# Patient Record
Sex: FEMALE | Race: WHITE | NOT HISPANIC OR LATINO | Employment: OTHER
[De-identification: names, ages, dates, MRNs, and addresses within clinical notes are randomized per-mention and may not be internally consistent; named-entity substitution may affect disease eponyms.]

---

## 2019-10-18 ENCOUNTER — RECORDS - HEALTHEAST (OUTPATIENT)
Dept: ADMINISTRATIVE | Facility: OTHER | Age: 64
End: 2019-10-18

## 2019-10-18 LAB
LAB AP CHARGES (HE HISTORICAL CONVERSION): NORMAL
PATH REPORT.COMMENTS IMP SPEC: NORMAL
PATH REPORT.FINAL DX SPEC: NORMAL
PATH REPORT.GROSS SPEC: NORMAL
PATH REPORT.MICROSCOPIC SPEC OTHER STN: NORMAL
PATH REPORT.RELEVANT HX SPEC: NORMAL
RESULT FLAG (HE HISTORICAL CONVERSION): NORMAL

## 2021-05-30 ENCOUNTER — RECORDS - HEALTHEAST (OUTPATIENT)
Dept: ADMINISTRATIVE | Facility: CLINIC | Age: 66
End: 2021-05-30

## 2024-10-04 ENCOUNTER — TRANSFERRED RECORDS (OUTPATIENT)
Dept: HEALTH INFORMATION MANAGEMENT | Facility: CLINIC | Age: 69
End: 2024-10-04
Payer: COMMERCIAL

## 2024-11-13 RX ORDER — ALBUTEROL SULFATE 0.83 MG/ML
2.5 SOLUTION RESPIRATORY (INHALATION) 3 TIMES DAILY PRN
COMMUNITY
Start: 2023-12-01

## 2024-11-13 RX ORDER — ACETAMINOPHEN 500 MG
2 TABLET ORAL EVERY 6 HOURS PRN
COMMUNITY

## 2024-11-13 RX ORDER — INSULIN ASPART 100 [IU]/ML
INJECTION, SOLUTION INTRAVENOUS; SUBCUTANEOUS
COMMUNITY
Start: 2024-07-19

## 2024-11-13 RX ORDER — CHOLECALCIFEROL (VITAMIN D3) 50 MCG
2000 TABLET ORAL DAILY
COMMUNITY

## 2024-11-13 RX ORDER — MOMETASONE FUROATE AND FORMOTEROL FUMARATE DIHYDRATE 100; 5 UG/1; UG/1
2 AEROSOL RESPIRATORY (INHALATION) 2 TIMES DAILY PRN
Status: ON HOLD | COMMUNITY
Start: 2024-07-30 | End: 2024-11-18

## 2024-11-13 RX ORDER — INSULIN ASPART 100 [IU]/ML
INJECTION, SOLUTION INTRAVENOUS; SUBCUTANEOUS
Status: ON HOLD | COMMUNITY
End: 2024-11-18

## 2024-11-13 RX ORDER — CYANOCOBALAMIN 1000 UG/ML
1000 INJECTION, SOLUTION INTRAMUSCULAR; SUBCUTANEOUS WEEKLY
COMMUNITY

## 2024-11-13 RX ORDER — TOPIRAMATE 50 MG/1
50 TABLET, FILM COATED ORAL 2 TIMES DAILY
COMMUNITY
Start: 2024-07-09 | End: 2025-07-09

## 2024-11-14 ENCOUNTER — TRANSFERRED RECORDS (OUTPATIENT)
Dept: HEALTH INFORMATION MANAGEMENT | Facility: CLINIC | Age: 69
End: 2024-11-14
Payer: COMMERCIAL

## 2024-11-14 ENCOUNTER — TRANSFERRED RECORDS (OUTPATIENT)
Dept: MULTI SPECIALTY CLINIC | Facility: CLINIC | Age: 69
End: 2024-11-14
Payer: COMMERCIAL

## 2024-11-14 LAB
CREATININE (EXTERNAL): 2.1 MG/DL (ref 0.7–1.4)
GLUCOSE (EXTERNAL): 105 MG/DL (ref 60–99)
HBA1C MFR BLD: 6.69 % (ref 4–5.6)
POTASSIUM (EXTERNAL): 3.8 MMOL/L (ref 3.5–5.1)

## 2024-11-17 ENCOUNTER — ANESTHESIA EVENT (OUTPATIENT)
Dept: SURGERY | Facility: CLINIC | Age: 69
End: 2024-11-17
Payer: COMMERCIAL

## 2024-11-17 ASSESSMENT — COPD QUESTIONNAIRES: COPD: 1

## 2024-11-18 ENCOUNTER — APPOINTMENT (OUTPATIENT)
Dept: RADIOLOGY | Facility: CLINIC | Age: 69
End: 2024-11-18
Attending: ORTHOPAEDIC SURGERY
Payer: COMMERCIAL

## 2024-11-18 ENCOUNTER — HOSPITAL ENCOUNTER (OUTPATIENT)
Facility: CLINIC | Age: 69
Discharge: HOME | End: 2024-11-19
Attending: ORTHOPAEDIC SURGERY | Admitting: ORTHOPAEDIC SURGERY
Payer: COMMERCIAL

## 2024-11-18 ENCOUNTER — ANESTHESIA (OUTPATIENT)
Dept: SURGERY | Facility: CLINIC | Age: 69
End: 2024-11-18
Payer: COMMERCIAL

## 2024-11-18 DIAGNOSIS — G95.9 CERVICAL MYELOPATHY (H): Primary | ICD-10-CM

## 2024-11-18 LAB
GLUCOSE BLDC GLUCOMTR-MCNC: 102 MG/DL (ref 70–99)
GLUCOSE BLDC GLUCOMTR-MCNC: 123 MG/DL (ref 70–99)
GLUCOSE BLDC GLUCOMTR-MCNC: 143 MG/DL (ref 70–99)
GLUCOSE BLDC GLUCOMTR-MCNC: 195 MG/DL (ref 70–99)
GLUCOSE BLDC GLUCOMTR-MCNC: 236 MG/DL (ref 70–99)

## 2024-11-18 PROCEDURE — 999N000157 HC STATISTIC RCP TIME EA 10 MIN

## 2024-11-18 PROCEDURE — 82962 GLUCOSE BLOOD TEST: CPT

## 2024-11-18 PROCEDURE — 272N000001 HC OR GENERAL SUPPLY STERILE: Performed by: ORTHOPAEDIC SURGERY

## 2024-11-18 PROCEDURE — 250N000011 HC RX IP 250 OP 636

## 2024-11-18 PROCEDURE — 258N000003 HC RX IP 258 OP 636: Performed by: ORTHOPAEDIC SURGERY

## 2024-11-18 PROCEDURE — C1762 CONN TISS, HUMAN(INC FASCIA): HCPCS | Performed by: ORTHOPAEDIC SURGERY

## 2024-11-18 PROCEDURE — 271N000001 HC OR GENERAL SUPPLY NON-STERILE: Performed by: ORTHOPAEDIC SURGERY

## 2024-11-18 PROCEDURE — 258N000003 HC RX IP 258 OP 636

## 2024-11-18 PROCEDURE — 250N000005 HC OR RX SURGIFLO HEMOSTATIC MATRIX 10ML 199102S OPNP: Performed by: ORTHOPAEDIC SURGERY

## 2024-11-18 PROCEDURE — 250N000009 HC RX 250: Performed by: ORTHOPAEDIC SURGERY

## 2024-11-18 PROCEDURE — 250N000012 HC RX MED GY IP 250 OP 636 PS 637: Performed by: FAMILY MEDICINE

## 2024-11-18 PROCEDURE — 999N000182 XR SURGERY CARM FLUORO GREATER THAN 5 MIN: Mod: TC

## 2024-11-18 PROCEDURE — C1713 ANCHOR/SCREW BN/BN,TIS/BN: HCPCS | Performed by: ORTHOPAEDIC SURGERY

## 2024-11-18 PROCEDURE — 250N000025 HC SEVOFLURANE, PER MIN: Performed by: ORTHOPAEDIC SURGERY

## 2024-11-18 PROCEDURE — 999N000141 HC STATISTIC PRE-PROCEDURE NURSING ASSESSMENT: Performed by: ORTHOPAEDIC SURGERY

## 2024-11-18 PROCEDURE — 272N000004 HC RX 272: Performed by: ORTHOPAEDIC SURGERY

## 2024-11-18 PROCEDURE — 250N000013 HC RX MED GY IP 250 OP 250 PS 637: Performed by: ORTHOPAEDIC SURGERY

## 2024-11-18 PROCEDURE — 250N000011 HC RX IP 250 OP 636: Performed by: ORTHOPAEDIC SURGERY

## 2024-11-18 PROCEDURE — 360N000078 HC SURGERY LEVEL 5, PER MIN: Performed by: ORTHOPAEDIC SURGERY

## 2024-11-18 PROCEDURE — 258N000003 HC RX IP 258 OP 636: Performed by: ANESTHESIOLOGY

## 2024-11-18 PROCEDURE — 250N000013 HC RX MED GY IP 250 OP 250 PS 637: Performed by: FAMILY MEDICINE

## 2024-11-18 PROCEDURE — 710N000010 HC RECOVERY PHASE 1, LEVEL 2, PER MIN: Performed by: ORTHOPAEDIC SURGERY

## 2024-11-18 PROCEDURE — 250N000011 HC RX IP 250 OP 636: Performed by: PHYSICIAN ASSISTANT

## 2024-11-18 PROCEDURE — 250N000013 HC RX MED GY IP 250 OP 250 PS 637: Performed by: ANESTHESIOLOGY

## 2024-11-18 PROCEDURE — 370N000017 HC ANESTHESIA TECHNICAL FEE, PER MIN: Performed by: ORTHOPAEDIC SURGERY

## 2024-11-18 PROCEDURE — 250N000009 HC RX 250

## 2024-11-18 PROCEDURE — 250N000013 HC RX MED GY IP 250 OP 250 PS 637: Performed by: PHYSICIAN ASSISTANT

## 2024-11-18 PROCEDURE — C1889 IMPLANT/INSERT DEVICE, NOC: HCPCS | Performed by: ORTHOPAEDIC SURGERY

## 2024-11-18 DEVICE — DBM T45001 1CC PASTE GRAFTON PLUS
Type: IMPLANTABLE DEVICE | Site: SPINE CERVICAL | Status: FUNCTIONAL
Brand: GRAFTON®AND GRAFTON PLUS®DEMINERALIZED BONE MATRIX (DBM)

## 2024-11-18 DEVICE — IMPLANTABLE DEVICE: Type: IMPLANTABLE DEVICE | Site: SPINE CERVICAL | Status: FUNCTIONAL

## 2024-11-18 RX ORDER — GABAPENTIN 300 MG/1
600 CAPSULE ORAL AT BEDTIME
COMMUNITY

## 2024-11-18 RX ORDER — PROPOFOL 10 MG/ML
INJECTION, EMULSION INTRAVENOUS PRN
Status: DISCONTINUED | OUTPATIENT
Start: 2024-11-18 | End: 2024-11-18

## 2024-11-18 RX ORDER — NALOXONE HYDROCHLORIDE 0.4 MG/ML
0.2 INJECTION, SOLUTION INTRAMUSCULAR; INTRAVENOUS; SUBCUTANEOUS
Status: DISCONTINUED | OUTPATIENT
Start: 2024-11-18 | End: 2024-11-19 | Stop reason: HOSPADM

## 2024-11-18 RX ORDER — CYCLOBENZAPRINE HCL 10 MG
10 TABLET ORAL 3 TIMES DAILY PRN
Status: DISCONTINUED | OUTPATIENT
Start: 2024-11-18 | End: 2024-11-19 | Stop reason: HOSPADM

## 2024-11-18 RX ORDER — ONDANSETRON 4 MG/1
4 TABLET, ORALLY DISINTEGRATING ORAL EVERY 6 HOURS PRN
Status: DISCONTINUED | OUTPATIENT
Start: 2024-11-18 | End: 2024-11-19 | Stop reason: HOSPADM

## 2024-11-18 RX ORDER — ACETAMINOPHEN 325 MG/1
650 TABLET ORAL EVERY 4 HOURS PRN
Status: DISCONTINUED | OUTPATIENT
Start: 2024-11-21 | End: 2024-11-19 | Stop reason: HOSPADM

## 2024-11-18 RX ORDER — HYDROMORPHONE HCL IN WATER/PF 6 MG/30 ML
0.4 PATIENT CONTROLLED ANALGESIA SYRINGE INTRAVENOUS
Status: DISCONTINUED | OUTPATIENT
Start: 2024-11-18 | End: 2024-11-19 | Stop reason: HOSPADM

## 2024-11-18 RX ORDER — ROSUVASTATIN CALCIUM 10 MG/1
40 TABLET, COATED ORAL DAILY
Status: DISCONTINUED | OUTPATIENT
Start: 2024-11-18 | End: 2024-11-19 | Stop reason: HOSPADM

## 2024-11-18 RX ORDER — MAGNESIUM HYDROXIDE 1200 MG/15ML
LIQUID ORAL PRN
Status: DISCONTINUED | OUTPATIENT
Start: 2024-11-18 | End: 2024-11-18 | Stop reason: HOSPADM

## 2024-11-18 RX ORDER — NALOXONE HYDROCHLORIDE 0.4 MG/ML
0.4 INJECTION, SOLUTION INTRAMUSCULAR; INTRAVENOUS; SUBCUTANEOUS
Status: DISCONTINUED | OUTPATIENT
Start: 2024-11-18 | End: 2024-11-19 | Stop reason: HOSPADM

## 2024-11-18 RX ORDER — GABAPENTIN 300 MG/1
600 CAPSULE ORAL AT BEDTIME
Status: DISCONTINUED | OUTPATIENT
Start: 2024-11-19 | End: 2024-11-19 | Stop reason: HOSPADM

## 2024-11-18 RX ORDER — CEFAZOLIN SODIUM/WATER 2 G/20 ML
2 SYRINGE (ML) INTRAVENOUS
Status: COMPLETED | OUTPATIENT
Start: 2024-11-18 | End: 2024-11-18

## 2024-11-18 RX ORDER — PROCHLORPERAZINE MALEATE 5 MG/1
5 TABLET ORAL EVERY 6 HOURS PRN
Status: DISCONTINUED | OUTPATIENT
Start: 2024-11-18 | End: 2024-11-19 | Stop reason: HOSPADM

## 2024-11-18 RX ORDER — ONDANSETRON 4 MG/1
4 TABLET, ORALLY DISINTEGRATING ORAL EVERY 30 MIN PRN
Status: DISCONTINUED | OUTPATIENT
Start: 2024-11-18 | End: 2024-11-18 | Stop reason: HOSPADM

## 2024-11-18 RX ORDER — DEXAMETHASONE SODIUM PHOSPHATE 4 MG/ML
INJECTION, SOLUTION INTRA-ARTICULAR; INTRALESIONAL; INTRAMUSCULAR; INTRAVENOUS; SOFT TISSUE
Status: DISCONTINUED
Start: 2024-11-18 | End: 2024-11-18 | Stop reason: HOSPADM

## 2024-11-18 RX ORDER — ACETAMINOPHEN 325 MG/1
975 TABLET ORAL ONCE
Status: COMPLETED | OUTPATIENT
Start: 2024-11-18 | End: 2024-11-18

## 2024-11-18 RX ORDER — BISACODYL 10 MG
10 SUPPOSITORY, RECTAL RECTAL DAILY PRN
Status: DISCONTINUED | OUTPATIENT
Start: 2024-11-21 | End: 2024-11-19 | Stop reason: HOSPADM

## 2024-11-18 RX ORDER — TOPIRAMATE 50 MG/1
50 TABLET, FILM COATED ORAL 2 TIMES DAILY
Status: DISCONTINUED | OUTPATIENT
Start: 2024-11-18 | End: 2024-11-19 | Stop reason: HOSPADM

## 2024-11-18 RX ORDER — ONDANSETRON 2 MG/ML
4 INJECTION INTRAMUSCULAR; INTRAVENOUS EVERY 6 HOURS PRN
Status: DISCONTINUED | OUTPATIENT
Start: 2024-11-18 | End: 2024-11-19 | Stop reason: HOSPADM

## 2024-11-18 RX ORDER — ONDANSETRON 2 MG/ML
INJECTION INTRAMUSCULAR; INTRAVENOUS PRN
Status: DISCONTINUED | OUTPATIENT
Start: 2024-11-18 | End: 2024-11-18

## 2024-11-18 RX ORDER — SODIUM CHLORIDE, SODIUM LACTATE, POTASSIUM CHLORIDE, CALCIUM CHLORIDE 600; 310; 30; 20 MG/100ML; MG/100ML; MG/100ML; MG/100ML
INJECTION, SOLUTION INTRAVENOUS CONTINUOUS
Status: DISCONTINUED | OUTPATIENT
Start: 2024-11-18 | End: 2024-11-18 | Stop reason: HOSPADM

## 2024-11-18 RX ORDER — CEFAZOLIN SODIUM/WATER 2 G/20 ML
2 SYRINGE (ML) INTRAVENOUS SEE ADMIN INSTRUCTIONS
Status: DISCONTINUED | OUTPATIENT
Start: 2024-11-18 | End: 2024-11-18 | Stop reason: HOSPADM

## 2024-11-18 RX ORDER — MULTIVITAMIN,THERAPEUTIC
1 TABLET ORAL DAILY
Status: DISCONTINUED | OUTPATIENT
Start: 2024-11-19 | End: 2024-11-19 | Stop reason: HOSPADM

## 2024-11-18 RX ORDER — OXYCODONE HYDROCHLORIDE 5 MG/1
5 TABLET ORAL EVERY 4 HOURS PRN
Status: DISCONTINUED | OUTPATIENT
Start: 2024-11-18 | End: 2024-11-19 | Stop reason: HOSPADM

## 2024-11-18 RX ORDER — FENTANYL CITRATE 50 UG/ML
INJECTION, SOLUTION INTRAMUSCULAR; INTRAVENOUS PRN
Status: DISCONTINUED | OUTPATIENT
Start: 2024-11-18 | End: 2024-11-18

## 2024-11-18 RX ORDER — FENTANYL CITRATE 50 UG/ML
25 INJECTION, SOLUTION INTRAMUSCULAR; INTRAVENOUS EVERY 5 MIN PRN
Status: DISCONTINUED | OUTPATIENT
Start: 2024-11-18 | End: 2024-11-18 | Stop reason: HOSPADM

## 2024-11-18 RX ORDER — DEXAMETHASONE SODIUM PHOSPHATE 4 MG/ML
4 INJECTION, SOLUTION INTRA-ARTICULAR; INTRALESIONAL; INTRAMUSCULAR; INTRAVENOUS; SOFT TISSUE
Status: DISCONTINUED | OUTPATIENT
Start: 2024-11-18 | End: 2024-11-18 | Stop reason: HOSPADM

## 2024-11-18 RX ORDER — DEXMEDETOMIDINE HYDROCHLORIDE 4 UG/ML
INJECTION, SOLUTION INTRAVENOUS CONTINUOUS PRN
Status: DISCONTINUED | OUTPATIENT
Start: 2024-11-18 | End: 2024-11-18

## 2024-11-18 RX ORDER — NICOTINE POLACRILEX 4 MG
15-30 LOZENGE BUCCAL
Status: DISCONTINUED | OUTPATIENT
Start: 2024-11-18 | End: 2024-11-19 | Stop reason: HOSPADM

## 2024-11-18 RX ORDER — LORATADINE 10 MG/1
10 TABLET ORAL DAILY PRN
Status: DISCONTINUED | OUTPATIENT
Start: 2024-11-18 | End: 2024-11-19 | Stop reason: HOSPADM

## 2024-11-18 RX ORDER — AMOXICILLIN 250 MG
1 CAPSULE ORAL 2 TIMES DAILY
Status: DISCONTINUED | OUTPATIENT
Start: 2024-11-18 | End: 2024-11-19 | Stop reason: HOSPADM

## 2024-11-18 RX ORDER — OXYCODONE HYDROCHLORIDE 5 MG/1
10 TABLET ORAL EVERY 4 HOURS PRN
Status: DISCONTINUED | OUTPATIENT
Start: 2024-11-18 | End: 2024-11-19 | Stop reason: HOSPADM

## 2024-11-18 RX ORDER — FENTANYL CITRATE 50 UG/ML
50 INJECTION, SOLUTION INTRAMUSCULAR; INTRAVENOUS EVERY 5 MIN PRN
Status: DISCONTINUED | OUTPATIENT
Start: 2024-11-18 | End: 2024-11-18 | Stop reason: HOSPADM

## 2024-11-18 RX ORDER — HYDROMORPHONE HCL IN WATER/PF 6 MG/30 ML
0.2 PATIENT CONTROLLED ANALGESIA SYRINGE INTRAVENOUS
Status: DISCONTINUED | OUTPATIENT
Start: 2024-11-18 | End: 2024-11-19 | Stop reason: HOSPADM

## 2024-11-18 RX ORDER — DEXTROSE MONOHYDRATE 25 G/50ML
25-50 INJECTION, SOLUTION INTRAVENOUS
Status: DISCONTINUED | OUTPATIENT
Start: 2024-11-18 | End: 2024-11-19 | Stop reason: HOSPADM

## 2024-11-18 RX ORDER — HYDROMORPHONE HCL IN WATER/PF 6 MG/30 ML
0.2 PATIENT CONTROLLED ANALGESIA SYRINGE INTRAVENOUS EVERY 5 MIN PRN
Status: DISCONTINUED | OUTPATIENT
Start: 2024-11-18 | End: 2024-11-18 | Stop reason: HOSPADM

## 2024-11-18 RX ORDER — ALBUTEROL SULFATE 90 UG/1
2 INHALANT RESPIRATORY (INHALATION) EVERY 6 HOURS PRN
Status: DISCONTINUED | OUTPATIENT
Start: 2024-11-18 | End: 2024-11-19 | Stop reason: HOSPADM

## 2024-11-18 RX ORDER — GABAPENTIN 100 MG/1
100 CAPSULE ORAL
Status: COMPLETED | OUTPATIENT
Start: 2024-11-18 | End: 2024-11-18

## 2024-11-18 RX ORDER — SODIUM CHLORIDE 9 MG/ML
INJECTION, SOLUTION INTRAVENOUS CONTINUOUS
Status: DISCONTINUED | OUTPATIENT
Start: 2024-11-18 | End: 2024-11-19

## 2024-11-18 RX ORDER — ALBUTEROL SULFATE 90 UG/1
2 INHALANT RESPIRATORY (INHALATION) EVERY 6 HOURS PRN
COMMUNITY

## 2024-11-18 RX ORDER — ACETAMINOPHEN 325 MG/1
975 TABLET ORAL EVERY 8 HOURS
Status: DISCONTINUED | OUTPATIENT
Start: 2024-11-18 | End: 2024-11-19 | Stop reason: HOSPADM

## 2024-11-18 RX ORDER — PROPOFOL 10 MG/ML
INJECTION, EMULSION INTRAVENOUS CONTINUOUS PRN
Status: DISCONTINUED | OUTPATIENT
Start: 2024-11-18 | End: 2024-11-18

## 2024-11-18 RX ORDER — GABAPENTIN 300 MG/1
300 CAPSULE ORAL EVERY MORNING
Status: DISCONTINUED | OUTPATIENT
Start: 2024-11-19 | End: 2024-11-19 | Stop reason: HOSPADM

## 2024-11-18 RX ORDER — ALBUTEROL SULFATE 0.83 MG/ML
2.5 SOLUTION RESPIRATORY (INHALATION) 3 TIMES DAILY PRN
Status: DISCONTINUED | OUTPATIENT
Start: 2024-11-18 | End: 2024-11-19 | Stop reason: HOSPADM

## 2024-11-18 RX ORDER — HYDROMORPHONE HCL IN WATER/PF 6 MG/30 ML
0.4 PATIENT CONTROLLED ANALGESIA SYRINGE INTRAVENOUS EVERY 5 MIN PRN
Status: DISCONTINUED | OUTPATIENT
Start: 2024-11-18 | End: 2024-11-18 | Stop reason: HOSPADM

## 2024-11-18 RX ORDER — ONDANSETRON 2 MG/ML
4 INJECTION INTRAMUSCULAR; INTRAVENOUS EVERY 30 MIN PRN
Status: DISCONTINUED | OUTPATIENT
Start: 2024-11-18 | End: 2024-11-18 | Stop reason: HOSPADM

## 2024-11-18 RX ORDER — NALOXONE HYDROCHLORIDE 0.4 MG/ML
0.1 INJECTION, SOLUTION INTRAMUSCULAR; INTRAVENOUS; SUBCUTANEOUS
Status: DISCONTINUED | OUTPATIENT
Start: 2024-11-18 | End: 2024-11-18 | Stop reason: HOSPADM

## 2024-11-18 RX ORDER — POLYETHYLENE GLYCOL 3350 17 G/17G
17 POWDER, FOR SOLUTION ORAL DAILY
Status: DISCONTINUED | OUTPATIENT
Start: 2024-11-19 | End: 2024-11-19 | Stop reason: HOSPADM

## 2024-11-18 RX ORDER — METOPROLOL SUCCINATE 100 MG/1
100 TABLET, EXTENDED RELEASE ORAL DAILY
Status: DISCONTINUED | OUTPATIENT
Start: 2024-11-19 | End: 2024-11-19 | Stop reason: HOSPADM

## 2024-11-18 RX ORDER — CEFAZOLIN SODIUM 1 G/3ML
1 INJECTION, POWDER, FOR SOLUTION INTRAMUSCULAR; INTRAVENOUS EVERY 8 HOURS
Status: COMPLETED | OUTPATIENT
Start: 2024-11-18 | End: 2024-11-19

## 2024-11-18 RX ORDER — DEXAMETHASONE SODIUM PHOSPHATE 10 MG/ML
INJECTION, SOLUTION INTRAMUSCULAR; INTRAVENOUS PRN
Status: DISCONTINUED | OUTPATIENT
Start: 2024-11-18 | End: 2024-11-18

## 2024-11-18 RX ORDER — LIDOCAINE HYDROCHLORIDE 10 MG/ML
INJECTION, SOLUTION INFILTRATION; PERINEURAL PRN
Status: DISCONTINUED | OUTPATIENT
Start: 2024-11-18 | End: 2024-11-18

## 2024-11-18 RX ORDER — LIDOCAINE 40 MG/G
CREAM TOPICAL
Status: DISCONTINUED | OUTPATIENT
Start: 2024-11-18 | End: 2024-11-18 | Stop reason: HOSPADM

## 2024-11-18 RX ADMIN — OXYCODONE 10 MG: 5 TABLET ORAL at 19:12

## 2024-11-18 RX ADMIN — PHENYLEPHRINE HYDROCHLORIDE 100 MCG: 10 INJECTION INTRAVENOUS at 11:23

## 2024-11-18 RX ADMIN — DEXMEDETOMIDINE 0.3 MCG/KG/HR: 100 INJECTION, SOLUTION, CONCENTRATE INTRAVENOUS at 11:04

## 2024-11-18 RX ADMIN — LIDOCAINE HYDROCHLORIDE 5 ML: 10 INJECTION, SOLUTION INFILTRATION; PERINEURAL at 11:04

## 2024-11-18 RX ADMIN — CEFAZOLIN 1 G: 1 INJECTION, POWDER, FOR SOLUTION INTRAMUSCULAR; INTRAVENOUS at 18:25

## 2024-11-18 RX ADMIN — PHENYLEPHRINE HYDROCHLORIDE 0.5 MCG/KG/MIN: 10 INJECTION INTRAVENOUS at 11:04

## 2024-11-18 RX ADMIN — HYDROMORPHONE HYDROCHLORIDE 0.4 MG: 0.2 INJECTION, SOLUTION INTRAMUSCULAR; INTRAVENOUS; SUBCUTANEOUS at 20:46

## 2024-11-18 RX ADMIN — PHENYLEPHRINE HYDROCHLORIDE 0.5 MCG/KG/MIN: 10 INJECTION INTRAVENOUS at 11:23

## 2024-11-18 RX ADMIN — ROSUVASTATIN CALCIUM 40 MG: 10 TABLET, FILM COATED ORAL at 17:05

## 2024-11-18 RX ADMIN — PHENYLEPHRINE HYDROCHLORIDE 100 MCG: 10 INJECTION INTRAVENOUS at 11:21

## 2024-11-18 RX ADMIN — GABAPENTIN 100 MG: 100 CAPSULE ORAL at 09:57

## 2024-11-18 RX ADMIN — ONDANSETRON 4 MG: 2 INJECTION INTRAMUSCULAR; INTRAVENOUS at 13:13

## 2024-11-18 RX ADMIN — PROPOFOL 150 MCG/KG/MIN: 10 INJECTION, EMULSION INTRAVENOUS at 11:04

## 2024-11-18 RX ADMIN — Medication 2 G: at 10:51

## 2024-11-18 RX ADMIN — SODIUM CHLORIDE, POTASSIUM CHLORIDE, SODIUM LACTATE AND CALCIUM CHLORIDE: 600; 310; 30; 20 INJECTION, SOLUTION INTRAVENOUS at 10:51

## 2024-11-18 RX ADMIN — PROPOFOL 110 MG: 10 INJECTION, EMULSION INTRAVENOUS at 11:04

## 2024-11-18 RX ADMIN — MIDAZOLAM 2 MG: 1 INJECTION INTRAMUSCULAR; INTRAVENOUS at 10:51

## 2024-11-18 RX ADMIN — INSULIN ASPART 2 UNITS: 100 INJECTION, SOLUTION INTRAVENOUS; SUBCUTANEOUS at 17:05

## 2024-11-18 RX ADMIN — HYDROMORPHONE HYDROCHLORIDE 0.5 MG: 1 INJECTION, SOLUTION INTRAMUSCULAR; INTRAVENOUS; SUBCUTANEOUS at 12:53

## 2024-11-18 RX ADMIN — TOPIRAMATE 50 MG: 50 TABLET, FILM COATED ORAL at 20:50

## 2024-11-18 RX ADMIN — Medication 60 MG: at 11:04

## 2024-11-18 RX ADMIN — ACETAMINOPHEN 975 MG: 325 TABLET ORAL at 20:50

## 2024-11-18 RX ADMIN — ACETAMINOPHEN 975 MG: 325 TABLET ORAL at 09:57

## 2024-11-18 RX ADMIN — PROPOFOL 30 MG: 10 INJECTION, EMULSION INTRAVENOUS at 11:31

## 2024-11-18 RX ADMIN — SODIUM CHLORIDE: 9 INJECTION, SOLUTION INTRAVENOUS at 16:15

## 2024-11-18 RX ADMIN — ONDANSETRON 4 MG: 4 TABLET, ORALLY DISINTEGRATING ORAL at 19:15

## 2024-11-18 RX ADMIN — DEXAMETHASONE SODIUM PHOSPHATE 4 MG: 10 INJECTION, SOLUTION INTRAMUSCULAR; INTRAVENOUS at 11:18

## 2024-11-18 RX ADMIN — FENTANYL CITRATE 100 MCG: 50 INJECTION INTRAMUSCULAR; INTRAVENOUS at 11:04

## 2024-11-18 RX ADMIN — HYDROMORPHONE HYDROCHLORIDE 0.5 MG: 1 INJECTION, SOLUTION INTRAMUSCULAR; INTRAVENOUS; SUBCUTANEOUS at 11:56

## 2024-11-18 ASSESSMENT — ACTIVITIES OF DAILY LIVING (ADL)
ADLS_ACUITY_SCORE: 0

## 2024-11-18 ASSESSMENT — LIFESTYLE VARIABLES: TOBACCO_USE: 1

## 2024-11-18 NOTE — OP NOTE
Orthopedic  Operative Note    Pre-operative diagnosis: 1.  Cervical myelopathy secondary to adjacent segment degeneration C4-5 above previous multilevel ACDF    Post-operative diagnosis: 1.  Cervical myelopathy secondary to adjacent segment degeneration C4-5 above previous multilevel ACDF    Procedure: 1.  C4-5 ACDF, SeaSpine   2.  C4-5 anterior cervical plate application   3.  Local autograft, allograft bone grafting   4.  Use of intraoperative microscopy   5.  EMG, SSEP, MEP neuromonitoring    Surgeon: Fernando Perez MD    Assistant(s): Oralia Forman PA-C is an experienced first surgical assistant whose assistance was necessary for patient positioning, hemostasis, soft tissue and neural retraction, closure, and safe progression of surgery.      Anesthesia: General endotracheal anesthesia    Estimated blood loss: 5 mL     Drains: None    Specimens: None    Indications:                               The patient is a 69-year-old female who developed cervical myelopathic symptoms in the setting of central stenosis above a previous ACDF.  She failed conservative measures and elected for surgical intervention in the form of a C4-5 ACDF.    I again reviewed the operative indications, the general and specific risks, benefits, and rehabilitation issues. Details of the procedure and postoperative recovery is highlighted. Patient and attending family appear to understand the issues and express their consent proceed.     Findings: None    Complications: None     Procedure Detail: The patient was evaluated in the preoperative holding area.  The patient was given the opportunity ask questions regarding the procedure in detail, including the risks, benefits, and alternatives.  The patient provided informed consent to proceed.  The patient's right sided neck was marked with ink in the surgeon's initials.  The patient was transported back to the operative suite on a gurney.  There, the patient was inducted under general  anesthesia by our anesthesia colleagues after being transferred to a regular OR table.  The arms were papoosed with ample padding of the ulnar nerve.  An axillary roll was placed underneath the patient's shoulder blades, and the patient's head was rested on a foam doughnut.  Care was taken not to over extend the neck.  The neck was then prepped and draped in the usual sterile fashion.  A preprocedural pause was performed to identify the correct patient, procedure to be performed, and administration of preoperative antibiotics.     The procedure was then initiated with a transverse incision along neck crease on the right side of the neck at the anticipated level of the procedure.  Subcutaneous tissues were divided using a Bovie, and the platysma was divided in line with the incision with a Bovie.  I then undermined the platysma both superiorly and inferiorly with the use of a Bovie and an Adson.  I then performed a standard Tubbs-Santiago approach on the right, exploring the interval between the carotid sheath on one side and the trachea and esophagus on the other side.  Once I was down to the pretracheal fascia, I moved the esophagus to the side using a Cloward, and used Kitners to thin the fascia and expose the ALL.  I used a curved clamp on the anticipated level of the operation, and obtained a crosstable lateral to confirm that we were in fact at the correct level.  Crosstable lateral confirmed that we were at the C4-5 interval.  Once the level had been confirmed, I cleaned the soft tissues off of the anterior spine using a Bovie.  I carefully mobilized the longus coli bilaterally so that they would fit underneath the trimline retractor.  Once the soft tissues and longus coli had been appropriately exposed and mobilized, I placed a trimline retractor.  The operative microscope was then brought in for better visualization.  I placed [place] pins measuring well mm in the vertebral bodies of C4 and C5, and began the  procedure at that level.  I first attempted to remove the cephalad screws of the old plate, but they were cold welded into the plate and could not be moved.  Therefore, I used a metal cutting bur to resect the cephalad portion of the plate, taking care to retain as many metal fragments as possible using gel.  After I had removed the cephalad portion of the plate, I removed the anterior osteophyte of C4 and retained this for bone graft purposes.  I then used a Bovie to perform an annulotomy, and then used a combination of high-speed bur, straight, and up-biting curettes to remove the disc material.  Once I was down to PLL, I used a to be reverse angled 2B curette to divide the PLL at the level of the foramen.  I then excised the PLL using a combination of #1 and #2 Kerrisons.  Once uncovertebral osteophytes were removed, I interrogated the foramen bilaterally with a blunt nerve hook, and found them to be widely patent.  Hemostasis was obtained using combination of Surgi-Marcus and bipolar electrocautery.  I then trialed screw sizes, and found size 12 to be appropriate for this patient.  I then trialed implant sizes, and found a 5 mm small footprint trial to be appropriate for this patient.  I then ensured that all cartilaginous material was removed from the endplates, and carefully brushed the endplates with a high-speed bur.  I then placed the final implant. packed with local autograft and allograft.     Once this had been completed, the [place] pins were removed and their holes were packed with bone wax.  I then placed a plate, and secured it in place with screws. I locked each screw in place.  A final AP and lateral was obtained, which confirmed appropriate positioning and levels of the implants.  There was minimal bleeding so I elected not to place a drain. The wound was copiously irrigated.  I obtained final hemostasis using bipolar electrocautery and Floseal.  I inspected the esophagus and found no obvious signs of  trauma.  The platysma was closed with a running 3-0 PDS.  The skin was closed with 4-0 Monocryl, and Steri-Strips were placed over the wound.  All sponge and needle counts were correct at the end of the procedure.  The patient tolerated procedure without complication.  EMG, SSEP, MEP neuro monitoring were stable throughout the procedure, and in fact the motors improved following completion of the decompression.             Condition: Stable     Weight bearing status: Weight bearing as tolerated     Activity:      Anticoagulation plan:    Plan:      Fernando Perez MD  [facility]  Date:  11/18/2024  1:10 PM   Activity as tolerated  Patient may move about with assist as indicated or with supervision    Ambulation and mechanical prophylaxis.    The patient will be transferred to the floor once they clear PACU criteria.  Ancef will be given postoperatively for antibiotic prophylaxis, and the patient will mobilize for DVT prophylaxis.  The patient will be on strict no heavy lifting, twisting, or bending requirements for the next 3 months.  The patient will follow-up with me in 2 weeks for a follow-up visit.

## 2024-11-18 NOTE — CONSULTS
[facility] [place] MEDICINE CONSULT NOTE   Physician requesting consult: Fernando Perez*    Reason for consult: Postoperative medical management of medical co-morbidities as below    Assessment and Plan    Michaelle Olsen is a 69 year old female with a history of DM2, dyslipidemia, essential hypertension, coronary artery disease with coronary artery stent, CKD 3, copd, obstructive sleep apnea, compliant with CPAP use, underwent C4-C5 and anterior cervical discectomy and fusion.  Back pain is stable.  No new radiculopathy.  Hemodynamically stable postoperatively.    Procedure(s):  CERVICAL 4- CERVICAL 5 ANTERIOR CERVICAL DISCECTOMY FUSION  Post-operative Day: Day of Surgery  Code status:Full Code     Essential hypertension  Metoprolol 100 mg daily    DM2  Lantus 25 unit twice a day  Lantus 15 unit tonight, lower dose  Diabetic diet and insulin sliding scale    Dyslipidemia  Crestor 40 mg daily    Coronary artery disease with coronary artery stent  Stable and asymptomatic  Resume aspirin, beta-blocker, statin    Obstructive sleep apnea  Compliant with CPAP use    COPD  Stable and asymptomatic  Albuterol inhaler/nebulizer 4 times a day as needed    CKD 3  Creatinine was 2 on 11/14    Status post C4-C5 anterior cervical discectomy and fusion  Resume routine postoperative care  Physical and Occupational Therapy  Use incentive spirometry frequently  DVT prophylaxis per orthopedic surgery, early ambulation and SCDs  Pain control with Tylenol 975 mg every 8 hours, 650 mg every 4 hours as needed, oxycodone 5 to 10 mg every 4 hours as needed, IV Dilaudid 0.2 to 0.4 mg every 2 hours as needed    -Reviewed the patient's preoperative H and P and updated missing elements.  -Home medication reconciliation has been reviewed.  Medications have been ordered as noted from the home list and changes are documented above     HISTORY     Michaelle Olsen is a 69 year old female with a history of DM2,  dyslipidemia, essential hypertension, coronary artery disease with coronary artery stent, CKD 3, copd, obstructive sleep apnea, compliant with CPAP use, underwent C4-C5 and anterior cervical discectomy and fusion.  Back pain is stable.  No new radiculopathy.  Hemodynamically stable postoperatively.  Taking metoprolol 100 mg daily for hypertension.  Coronary artery disease is stable.  Taking aspirin, beta-blocker, statin.  Denies headache, chest pain, breathing difficulty, palpitation, nausea, vomiting, abdominal pain or urinary symptoms.  Does not have history of bleeding or clotting disorder.  Preop physical is reviewed.  History is provided by the patient.    Past Medical History     Patient Active Problem List    Diagnosis Date Noted    Cervical myelopathy (H) 11/18/2024     Priority: Medium        Surgical History   She  has a past surgical history that includes Laminectomy lumbar posterior microscopic one level (Bilateral, 11/15/2016) and IR Lumbar Puncture (5/21/2019).     Past Surgical History:   Procedure Laterality Date    IR LUMBAR PUNCTURE  5/21/2019    LAMINECTOMY LUMBAR POSTERIOR MICROSCOPIC ONE LEVEL Bilateral 11/15/2016    Procedure: LAMINECTOMY LUMBAR POSTERIOR MICROSCOPIC ONE LEVEL;  Surgeon: Kushal Coto MD;  Location: [facility] OR       Family History    Reviewed.  Positive family history of coronary artery disease, stroke, diabetes, thyroid disease    Social History    Reviewed, and she  reports that she has quit smoking. Her smoking use included cigarettes. She has never used smokeless tobacco. She reports that she does not currently use alcohol. She reports that she does not currently use drugs.  Social History     Tobacco Use    Smoking status: Former     Types: Cigarettes    Smokeless tobacco: Never   Substance Use Topics    Alcohol use: Not Currently      Allergies   No Known Allergies    Prior to Admission Medications      Medications Prior to Admission   Medication Sig Dispense Refill  Last Dose/Taking    acetaminophen (TYLENOL) 500 MG tablet Take 2 tablets by mouth every 6 hours as needed.   Past Week    albuterol (PROAIR HFA/PROVENTIL HFA/VENTOLIN HFA) 108 (90 Base) MCG/ACT inhaler Inhale 2 puffs into the lungs every 6 hours as needed for shortness of breath, wheezing or cough.   Past Week    albuterol (PROVENTIL) (2.5 MG/3ML) 0.083% neb solution Inhale 2.5 mg into the lungs 3 times daily as needed.   Past Month    aspirin 81 MG tablet Take 81 mg by mouth daily   11/2/2024 Morning    cyanocobalamin (CYANOCOBALAMIN) 1000 mcg/mL injection Inject 1,000 mcg into the muscle once a week.   Past Week    CYCLOBENZAPRINE HCL PO Take 10 mg by mouth 3 times daily.   11/17/2024 Evening    gabapentin (NEURONTIN) 300 MG capsule Take 600 mg by mouth at bedtime.   11/17/2024 Evening    gabapentin (NEURONTIN) 300 MG capsule Take 300 mg by mouth every morning.   11/17/2024 Morning    insulin aspart (NOVOLOG FLEXPEN) 100 UNIT/ML pen Inject subcutaneously 3 times daily (with meals). 1 Unit per 4 Grams of carb at meals   11/17/2024 Evening    insulin glargine (LANTUS) 100 UNIT/ML PEN Inject 25 Units subcutaneously 2 times daily.   11/17/2024 Evening    METOPROLOL SUCCINATE ER PO Take 100 mg by mouth daily.   11/18/2024    Rosuvastatin Calcium (CRESTOR PO) Take 40 mg by mouth daily.   11/17/2024 Morning    topiramate (TOPAMAX) 50 MG tablet Take 50 mg by mouth 2 times daily.   11/17/2024 Evening    Vitamin D3 50 mcg (2000 units) tablet Take 2,000 Units by mouth daily.   Past Week Morning       Review of Systems   A 12 point comprehensive review of systems was negative except as noted above.    OBJECTIVE         Physical Exam   Temp:  [97  F (36.1  C)-97.7  F (36.5  C)] 97.2  F (36.2  C)  Pulse:  [63-73] 70  Resp:  [6-16] 10  BP: (107-147)/(58-86) 145/86  SpO2:  [95 %-99 %] 97 %  Body mass index is 26.37 kg/m .    GENERAL:  Alert, appears comfortable, in no acute distress, appears stated age   HEAD:  Normocephalic,  without obvious abnormality, atraumatic   EYES:  PERRL, conjunctiva clear,  EOM's intact   NOSE: Nares normal,  mucosa normal, no drainage   THROAT: Lips, mucosa,  gums normal, mouth moist   NECK: Supple, status post anterior neck surgery   BACK:   Symmetric, no curvature, ROM normal   LUNGS:   Clear to auscultation bilaterally, no rales, rhonchi, or wheezing, symmetric chest rise on inhalation, respirations unlabored   CHEST WALL:  No tenderness or deformity   HEART:  Regular rate and rhythm, S1 and S2 normal, no murmur, rub, or gallop    ABDOMEN:   Soft, non-tender, bowel sounds active , no masses, no organomegaly, no rebound or guarding   EXTREMITIES: No BLE edema    SKIN: No rashes in the visualized areas   NEURO: Alert, oriented x 3    PSYCH: Cooperative, behavior is appropriate        Labs Reviewed Personally By Myself   Hemoglobin A1c 6.7  Creatinine 2    Preoperative Labs Reviewed Personally By Myself     Thank you for this consultation.  Appreciate the opportunity to participate in the care of Michaelle Olsen, please feel free to contact us for any questions or concerns.    Total time spent 70 min  Sravanthi Yeung MD  [facility]  [facility]  Phone: #[phone]

## 2024-11-18 NOTE — ANESTHESIA CARE TRANSFER NOTE
Patient: Michaelle Olsen    Procedure: Procedure(s):  CERVICAL 4- CERVICAL 5 ANTERIOR CERVICAL DISCECTOMY FUSION       Diagnosis: Neck pain [M54.2]  Spinal stenosis in cervical region [M48.02]  Brachial neuritis [M54.12]  Diagnosis Additional Information: No value filed.    Anesthesia Type:   General     Note:    Oropharynx: oral airway in place and spontaneously breathing  Level of Consciousness: drowsy  Oxygen Supplementation: face mask  Level of Supplemental Oxygen (L/min / FiO2): 10  Independent Airway: airway patency satisfactory and stable  Dentition: dentition unchanged  Vital Signs Stable: post-procedure vital signs reviewed and stable  Report to RN Given: handoff report given  Patient transferred to: PACU    Handoff Report: Identifed the Patient, Identified the Reponsible Provider, Reviewed the pertinent medical history, Discussed the surgical course, Reviewed Intra-OP anesthesia mangement and issues during anesthesia, Set expectations for post-procedure period and Allowed opportunity for questions and acknowledgement of understanding      Vitals:  Vitals Value Taken Time   /58 11/18/24 1352   Temp 36.3  C (97.34  F) 11/18/24 1356   Pulse 71 11/18/24 1356   Resp 14 11/18/24 1356   SpO2 99 % 11/18/24 1356   Vitals shown include unfiled device data.    Electronically Signed By: JUANA Dixon CRNA  November 18, 2024  1:58 PM

## 2024-11-18 NOTE — PROVIDER NOTIFICATION
Pt came to PACU with oral airway intact, requiring chin lift for adequate breathing, MDA made aware, once patient was more awake, Oral airway removed at 1420, and Home CPAP placed on patient. Monitored Patient closely with no other Breathing difficulties.

## 2024-11-18 NOTE — ANESTHESIA PREPROCEDURE EVALUATION
Anesthesia Pre-Procedure Evaluation    Patient: Michaelle Olsen   MRN: [id number] : 1955        Procedure : Procedure(s):  CERVICAL 4- CERVICAL 5 ANTERIOR CERVICAL DISCECTOMY FUSION          Past Medical History:   Diagnosis Date     Diabetes (H)      Heart attack (H)      Sleep apnea      Stented coronary artery      Uncomplicated asthma       Past Surgical History:   Procedure Laterality Date     IR LUMBAR PUNCTURE  2019     LAMINECTOMY LUMBAR POSTERIOR MICROSCOPIC ONE LEVEL Bilateral 11/15/2016    Procedure: LAMINECTOMY LUMBAR POSTERIOR MICROSCOPIC ONE LEVEL;  Surgeon: Kushal Coto MD;  Location: [facility] OR      No Known Allergies   Social History     Tobacco Use     Smoking status: Never     Smokeless tobacco: Never   Substance Use Topics     Alcohol use: Not Currently      Wt Readings from Last 1 Encounters:   11/15/16 61.2 kg (135 lb)        Anesthesia Evaluation   Pt has had prior anesthetic.     No history of anesthetic complications       ROS/MED HX  ENT/Pulmonary:     (+) sleep apnea, uses CPAP,              tobacco use (quit 10/2024), Past use,     asthma    COPD (Emphysema),              Neurologic:       Cardiovascular:     (+)  hypertension- -  CAD - past MI - stent-                                   (-) angina, CHICAS and angina   METS/Exercise Tolerance: >4 METS    Hematologic:     (+)      anemia,          Musculoskeletal: Comment: Cervical spinal stenosis      GI/Hepatic:  - neg GI/hepatic ROS     Renal/Genitourinary:     (+) renal disease, type: CRI,            Endo:     (+)  type II DM,   Using insulin,                 Psychiatric/Substance Use:       Infectious Disease:       Malignancy:       Other:            Physical Exam    Airway        Mallampati: II   TM distance: > 3 FB   Neck ROM: full   Mouth opening: > 3 cm    Respiratory Devices and Support         Dental       (+) Multiple crowns, permanant bridges    B=Bridge, C=Chipped, L=Loose, M=Missing    Cardiovascular    "cardiovascular exam normal          Pulmonary   pulmonary exam normal            OUTSIDE LABS:  CBC: No results found for: \"WBC\", \"HGB\", \"HCT\", \"PLT\"  BMP: No results found for: \"NA\", \"POTASSIUM\", \"CHLORIDE\", \"CO2\", \"BUN\", \"CR\", \"GLC\"  COAGS: No results found for: \"PTT\", \"INR\", \"FIBR\"  POC:   Lab Results   Component Value Date    BGM 95 11/15/2016     HEPATIC: No results found for: \"ALBUMIN\", \"PROTTOTAL\", \"ALT\", \"AST\", \"GGT\", \"ALKPHOS\", \"BILITOTAL\", \"BILIDIRECT\", \"MICH\"  OTHER: No results found for: \"PH\", \"LACT\", \"A1C\", \"SHEILA\", \"PHOS\", \"MAG\", \"LIPASE\", \"AMYLASE\", \"TSH\", \"T4\", \"T3\", \"CRP\", \"SED\"    Anesthesia Plan    ASA Status:  3    NPO Status:  NPO Appropriate    Anesthesia Type: General.     - Airway: ETT   Induction: Intravenous, Propofol.   Maintenance: Balanced.   Techniques and Equipment:     - Airway: Video-Laryngoscope       Consents    Anesthesia Plan(s) and associated risks, benefits, and realistic alternatives discussed. Questions answered and patient/representative(s) expressed understanding.     - Discussed: Risks, Benefits and Alternatives for BOTH SEDATION and the PROCEDURE were discussed     - Discussed with:  Patient       - Patient is DNR/DNI Status: No     Use of blood products discussed: Yes.     - Discussed with: Patient.     - Consented: consented to blood products     Postoperative Care    Pain management: Multi-modal analgesia.   PONV prophylaxis: Ondansetron (or other 5HT-3), Dexamethasone or Solumedrol, Background Propofol Infusion     Comments:    Other Comments: Glidescope, neutral intubation  RSI    Precedex  Magnesium    Keep MAP greater than 75  Monitor BS     Neuromonitoring         Karla Toscano MD    I have reviewed the pertinent notes and labs in the chart from the past 30 days and (re)examined the patient.  Any updates or changes from those notes are reflected in this note.                                   "

## 2024-11-18 NOTE — ANESTHESIA POSTPROCEDURE EVALUATION
Patient: Michaelle Olsen    Procedure: Procedure(s):  CERVICAL 4- CERVICAL 5 ANTERIOR CERVICAL DISCECTOMY FUSION       Anesthesia Type:  General    Note:  Disposition: Admission   Postop Pain Control: Uneventful            Sign Out: Well controlled pain   PONV: No   Neuro/Psych: Uneventful            Sign Out: Acceptable/Baseline neuro status   Airway/Respiratory: Uneventful            Sign Out: Acceptable/Baseline resp. status   CV/Hemodynamics: Uneventful            Sign Out: Acceptable CV status; No obvious hypovolemia; No obvious fluid overload   Other NRE: NONE   DID A NON-ROUTINE EVENT OCCUR? No           Last vitals:  Vitals Value Taken Time   /79 11/18/24 1540   Temp 36.4  C (97.5  F) 11/18/24 1540   Pulse 73 11/18/24 1540   Resp 10 11/18/24 1540   SpO2 94 % 11/18/24 1540       Electronically Signed By: Karla Toscano MD  November 18, 2024  3:49 PM

## 2024-11-18 NOTE — ANESTHESIA PROCEDURE NOTES
Airway       Patient location during procedure: OR       Procedure Start/Stop Times: 11/18/2024 11:08 AM  Staff -        Anesthesiologist:  Karla Toscano MD       CRNA: Héctor Matamoros APRN CRNA       Performed By: CRNA  Consent for Airway        Urgency: elective  Indications and Patient Condition       Indications for airway management: rosa-procedural       Induction type:intravenous       Mask difficulty assessment: 1 - vent by mask    Final Airway Details       Final airway type: endotracheal airway       Successful airway: ETT - single and Oral  Endotracheal Airway Details        ETT size (mm): 7.0       Cuffed: yes       Cuff volume (mL): 8       Successful intubation technique: video laryngoscopy       VL Blade Size: Glidescope 3       Grade View of Cords: 1       Adjucts: stylet       Position: Left       Measured from: lips       Secured at (cm): 22       Bite block used: Soft    Post intubation assessment        Placement verified by: capnometry, equal breath sounds and chest rise        Number of attempts at approach: 1       Number of other approaches attempted: 0       Secured with: tape       Ease of procedure: easy       Dentition: Intact and Unchanged    Medication(s) Administered   Medication Administration Time: 11/18/2024 11:08 AM

## 2024-11-18 NOTE — PHARMACY-ADMISSION MEDICATION HISTORY
Pharmacist Admission Medication History    Admission medication history is complete. The information provided in this note is only as accurate as the sources available at the time of the update.    Information Source(s): Patient and CareEverywhere/SureScripts via in-person    Pertinent Information:  None     Allergies reviewed with patient and updates made in EHR: yes    Medication History Completed By: Kaden Ahmadi [facility] 11/18/2024 10:00 AM    PTA Med List   Medication Sig Last Dose/Taking    acetaminophen (TYLENOL) 500 MG tablet Take 2 tablets by mouth every 6 hours as needed. Past Week    albuterol (PROAIR HFA/PROVENTIL HFA/VENTOLIN HFA) 108 (90 Base) MCG/ACT inhaler Inhale 2 puffs into the lungs every 6 hours as needed for shortness of breath, wheezing or cough. Past Week    albuterol (PROVENTIL) (2.5 MG/3ML) 0.083% neb solution Inhale 2.5 mg into the lungs 3 times daily as needed. Past Month    aspirin 81 MG tablet Take 81 mg by mouth daily 11/2/2024 Morning    cyanocobalamin (CYANOCOBALAMIN) 1000 mcg/mL injection Inject 1,000 mcg into the muscle once a week. Past Week    CYCLOBENZAPRINE HCL PO Take 10 mg by mouth 3 times daily. 11/17/2024 Evening    gabapentin (NEURONTIN) 300 MG capsule Take 600 mg by mouth at bedtime. 11/17/2024 Evening    gabapentin (NEURONTIN) 300 MG capsule Take 300 mg by mouth every morning. 11/17/2024 Morning    insulin aspart (NOVOLOG FLEXPEN) 100 UNIT/ML pen Inject subcutaneously 3 times daily (with meals). 1 Unit per 4 Grams of carb at meals 11/17/2024 Evening    insulin glargine (LANTUS) 100 UNIT/ML PEN Inject 25 Units subcutaneously 2 times daily. 11/17/2024 Evening    METOPROLOL SUCCINATE ER PO Take 100 mg by mouth daily. 11/18/2024    Rosuvastatin Calcium (CRESTOR PO) Take 40 mg by mouth daily. 11/17/2024 Morning    topiramate (TOPAMAX) 50 MG tablet Take 50 mg by mouth 2 times daily. 11/17/2024 Evening    Vitamin D3 50 mcg (2000 units) tablet Take 2,000 Units by mouth daily. Past  Week Morning

## 2024-11-19 ENCOUNTER — APPOINTMENT (OUTPATIENT)
Dept: RADIOLOGY | Facility: CLINIC | Age: 69
End: 2024-11-19
Attending: ORTHOPAEDIC SURGERY
Payer: COMMERCIAL

## 2024-11-19 ENCOUNTER — APPOINTMENT (OUTPATIENT)
Dept: OCCUPATIONAL THERAPY | Facility: CLINIC | Age: 69
End: 2024-11-19
Attending: ORTHOPAEDIC SURGERY
Payer: COMMERCIAL

## 2024-11-19 ENCOUNTER — APPOINTMENT (OUTPATIENT)
Dept: PHYSICAL THERAPY | Facility: CLINIC | Age: 69
End: 2024-11-19
Attending: ORTHOPAEDIC SURGERY
Payer: COMMERCIAL

## 2024-11-19 VITALS
OXYGEN SATURATION: 100 % | SYSTOLIC BLOOD PRESSURE: 125 MMHG | TEMPERATURE: 97.9 F | DIASTOLIC BLOOD PRESSURE: 60 MMHG | RESPIRATION RATE: 16 BRPM | HEIGHT: 60 IN | HEART RATE: 73 BPM | BODY MASS INDEX: 26.5 KG/M2 | WEIGHT: 135 LBS

## 2024-11-19 LAB
ANION GAP SERPL CALCULATED.3IONS-SCNC: 14 MMOL/L (ref 7–15)
BUN SERPL-MCNC: 18.1 MG/DL (ref 8–23)
CALCIUM SERPL-MCNC: 8.7 MG/DL (ref 8.8–10.4)
CHLORIDE SERPL-SCNC: 107 MMOL/L (ref 98–107)
CREAT SERPL-MCNC: 1.95 MG/DL (ref 0.51–0.95)
EGFRCR SERPLBLD CKD-EPI 2021: 27 ML/MIN/1.73M2
ERYTHROCYTE [DISTWIDTH] IN BLOOD BY AUTOMATED COUNT: 13.4 % (ref 10–15)
GLUCOSE BLDC GLUCOMTR-MCNC: 139 MG/DL (ref 70–99)
GLUCOSE BLDC GLUCOMTR-MCNC: 140 MG/DL (ref 70–99)
GLUCOSE BLDC GLUCOMTR-MCNC: 181 MG/DL (ref 70–99)
GLUCOSE SERPL-MCNC: 147 MG/DL (ref 70–99)
HCO3 SERPL-SCNC: 20 MMOL/L (ref 22–29)
HCT VFR BLD AUTO: 37.6 % (ref 35–47)
HGB BLD-MCNC: 12.1 G/DL (ref 11.7–15.7)
MCH RBC QN AUTO: 28.6 PG (ref 26.5–33)
MCHC RBC AUTO-ENTMCNC: 32.2 G/DL (ref 31.5–36.5)
MCV RBC AUTO: 89 FL (ref 78–100)
PLATELET # BLD AUTO: 139 10E3/UL (ref 150–450)
POTASSIUM SERPL-SCNC: 3.7 MMOL/L (ref 3.4–5.3)
RBC # BLD AUTO: 4.23 10E6/UL (ref 3.8–5.2)
SODIUM SERPL-SCNC: 141 MMOL/L (ref 135–145)
WBC # BLD AUTO: 12.9 10E3/UL (ref 4–11)

## 2024-11-19 PROCEDURE — 250N000013 HC RX MED GY IP 250 OP 250 PS 637: Performed by: FAMILY MEDICINE

## 2024-11-19 PROCEDURE — 80048 BASIC METABOLIC PNL TOTAL CA: CPT | Performed by: ORTHOPAEDIC SURGERY

## 2024-11-19 PROCEDURE — 999N000065 XR CERVICAL SPINE 2/3 VIEWS

## 2024-11-19 PROCEDURE — 97535 SELF CARE MNGMENT TRAINING: CPT | Mod: GO

## 2024-11-19 PROCEDURE — 97161 PT EVAL LOW COMPLEX 20 MIN: CPT | Mod: GP

## 2024-11-19 PROCEDURE — 97116 GAIT TRAINING THERAPY: CPT | Mod: GP

## 2024-11-19 PROCEDURE — 85027 COMPLETE CBC AUTOMATED: CPT | Performed by: ORTHOPAEDIC SURGERY

## 2024-11-19 PROCEDURE — 250N000011 HC RX IP 250 OP 636: Performed by: ORTHOPAEDIC SURGERY

## 2024-11-19 PROCEDURE — 82435 ASSAY OF BLOOD CHLORIDE: CPT | Performed by: ORTHOPAEDIC SURGERY

## 2024-11-19 PROCEDURE — 97166 OT EVAL MOD COMPLEX 45 MIN: CPT | Mod: GO

## 2024-11-19 PROCEDURE — 258N000003 HC RX IP 258 OP 636: Performed by: ORTHOPAEDIC SURGERY

## 2024-11-19 PROCEDURE — 250N000013 HC RX MED GY IP 250 OP 250 PS 637: Performed by: ORTHOPAEDIC SURGERY

## 2024-11-19 PROCEDURE — 250N000012 HC RX MED GY IP 250 OP 636 PS 637: Performed by: FAMILY MEDICINE

## 2024-11-19 PROCEDURE — 82962 GLUCOSE BLOOD TEST: CPT

## 2024-11-19 PROCEDURE — 82565 ASSAY OF CREATININE: CPT | Performed by: ORTHOPAEDIC SURGERY

## 2024-11-19 PROCEDURE — 36415 COLL VENOUS BLD VENIPUNCTURE: CPT | Performed by: ORTHOPAEDIC SURGERY

## 2024-11-19 RX ORDER — AMOXICILLIN 250 MG
1 CAPSULE ORAL 2 TIMES DAILY PRN
Qty: 42 TABLET | Refills: 0 | Status: SHIPPED | OUTPATIENT
Start: 2024-11-19

## 2024-11-19 RX ORDER — ASPIRIN 81 MG/1
TABLET ORAL
COMMUNITY
Start: 2024-11-19

## 2024-11-19 RX ORDER — OXYCODONE HYDROCHLORIDE 5 MG/1
5-10 TABLET ORAL EVERY 4 HOURS PRN
Qty: 30 TABLET | Refills: 0 | Status: SHIPPED | OUTPATIENT
Start: 2024-11-19

## 2024-11-19 RX ORDER — CYCLOBENZAPRINE HCL 10 MG
10 TABLET ORAL 3 TIMES DAILY PRN
COMMUNITY
Start: 2024-11-19

## 2024-11-19 RX ADMIN — CYCLOBENZAPRINE 10 MG: 10 TABLET, FILM COATED ORAL at 07:49

## 2024-11-19 RX ADMIN — METOPROLOL SUCCINATE 100 MG: 50 TABLET, EXTENDED RELEASE ORAL at 09:31

## 2024-11-19 RX ADMIN — CEFAZOLIN 1 G: 1 INJECTION, POWDER, FOR SOLUTION INTRAMUSCULAR; INTRAVENOUS at 02:24

## 2024-11-19 RX ADMIN — SODIUM CHLORIDE: 9 INJECTION, SOLUTION INTRAVENOUS at 01:00

## 2024-11-19 RX ADMIN — OXYCODONE 10 MG: 5 TABLET ORAL at 00:53

## 2024-11-19 RX ADMIN — INSULIN ASPART 1 UNITS: 100 INJECTION, SOLUTION INTRAVENOUS; SUBCUTANEOUS at 07:50

## 2024-11-19 RX ADMIN — OXYCODONE 10 MG: 5 TABLET ORAL at 09:31

## 2024-11-19 RX ADMIN — ACETAMINOPHEN 975 MG: 325 TABLET ORAL at 12:01

## 2024-11-19 RX ADMIN — TOPIRAMATE 50 MG: 50 TABLET, FILM COATED ORAL at 07:49

## 2024-11-19 RX ADMIN — OXYCODONE 10 MG: 5 TABLET ORAL at 04:58

## 2024-11-19 RX ADMIN — ROSUVASTATIN CALCIUM 40 MG: 10 TABLET, FILM COATED ORAL at 07:48

## 2024-11-19 RX ADMIN — ACETAMINOPHEN 975 MG: 325 TABLET ORAL at 04:55

## 2024-11-19 RX ADMIN — THERA TABS 1 TABLET: TAB at 07:49

## 2024-11-19 RX ADMIN — GABAPENTIN 300 MG: 300 CAPSULE ORAL at 07:49

## 2024-11-19 RX ADMIN — INSULIN GLARGINE 25 UNITS: 100 INJECTION, SOLUTION SUBCUTANEOUS at 07:49

## 2024-11-19 RX ADMIN — HYDROMORPHONE HYDROCHLORIDE 0.4 MG: 0.2 INJECTION, SOLUTION INTRAMUSCULAR; INTRAVENOUS; SUBCUTANEOUS at 07:45

## 2024-11-19 ASSESSMENT — ACTIVITIES OF DAILY LIVING (ADL)
ADLS_ACUITY_SCORE: 0

## 2024-11-19 NOTE — PROGRESS NOTES
Patient vital signs are at baseline: Yes  Patient able to ambulate as they were prior to admission or with assist devices provided by therapies during their stay:  Yes  Patient MUST void prior to discharge:  Yes  Patient able to tolerate oral intake:  Yes  Pain has adequate pain control using Oral analgesics:  Yes  Does patient have an identified :  Yes  Has goal D/C date and time been discussed with patient:  Yes - home this afternoon pending pain control

## 2024-11-19 NOTE — PROGRESS NOTES
11/19/24 0928   Appointment Info   Signing Clinician's Name / Credentials (PT) Celeste Hinton, PT, DPT   Quick Adds   Quick Adds Certification   Living Environment   People in Home spouse   Current Living Arrangements house   Home Accessibility stairs to enter home;stairs within home   Number of Stairs, Main Entrance 5   Stair Railings, Main Entrance railings on both sides of stairs   Transportation Anticipated family or friend will provide   Living Environment Comments all needs on the main level   Self-Care   Usual Activity Tolerance moderate   Current Activity Tolerance fair   Equipment Currently Used at Home none   Fall history within last six months yes   Number of times patient has fallen within last six months 1  (fainted)   Activity/Exercise/Self-Care Comment amb without AD, reports has been on a lifting restriction for a while   General Information   Onset of Illness/Injury or Date of Surgery 11/18/24   Referring Physician Fernando Perez MD   Patient/Family Therapy Goals Statement (PT) to go home today   Pertinent History of Current Problem (include personal factors and/or comorbidities that impact the POC) 68 y/o F POD #1 CERVICAL 4- CERVICAL 5 ANTERIOR CERVICAL DISCECTOMY FUSION. Per ortho: WBAT, activity as tolerated. Avoid excessive forward or side bending, twisting, pushing, pulling, or reaching, No lifting greater than 5 pounds    PMHx: DM2, dyslipidemia, essential hypertension, coronary artery disease with coronary artery stent, CKD 3, copd, obstructive sleep apnea, compliant with CPAP use   Existing Precautions/Restrictions fall;lifting;spinal  (cervical collar)   General Observations resting in chair, c-collar, on, spouse present in the room and very attentive to pt   Cognition   Affect/Mental Status (Cognition) WFL   Orientation Status (Cognition) oriented to;person;place;situation   Cognitive Status Comments intermittent required redirection with tasks, see OT note for details on  cognition   Pain Assessment   Patient Currently in Pain Yes, see Vital Sign flowsheet  (9/10 neck)   Range of Motion (ROM)   ROM Comment bilateral LE WFL   Strength (Manual Muscle Testing)   Strength Comments able to lift B LE against gravity, and demos functional strength for basic mobility tasks. MMT not formally tested.   Bed Mobility   Comment, (Bed Mobility) NT-pt sitting up in the recliner   Transfers   Comment, (Transfers) close SBA with sit to stand without AD   Gait/Stairs (Locomotion)   [place] Level (Gait) contact guard   Distance in Feet (Gait) 10   Deviations/Abnormal Patterns (Gait) byron decreased;weight shifting decreased  (decreased bilateral foot clearance)   Balance   Balance Comments impaired dynamic balance, veering left and right intermittently without UE support   Sensory Examination   Sensory Perception Comments reports numbness/tingling L shoulder-pt reports same as pre-op   Clinical Impression   Criteria for Skilled Therapeutic Intervention Yes, treatment indicated   PT Diagnosis (PT) impaired gait   Influenced by the following impairments c-collar, spinal precautions, impaired balance   Functional limitations due to impairments impaired IND with functional mobility, increased fall risk   Clinical Presentation (PT Evaluation Complexity) stable   Clinical Presentation Rationale clinical judgement   Clinical Decision Making (Complexity) low complexity   Planned Therapy Interventions (PT) gait training;patient/family education;stair training;transfer training;home program guidelines  (spinal precations)   Risk & Benefits of therapy have been explained evaluation/treatment results reviewed;care plan/treatment goals reviewed;participants voiced agreement with care plan;participants included;patient;spouse/significant other   PT Total Evaluation Time   PT Eval, Low Complexity Minutes (30962) 10   Therapy Certification   Start of care date 11/19/24   Certification date from 11/19/24    Certification date to 11/19/24   Medical Diagnosis CERVICAL 4- CERVICAL 5 ANTERIOR CERVICAL DISCECTOMY FUSION   Physical Therapy Goals   PT Frequency One time eval and treatment only   PT Predicted Duration/Target Date for Goal Attainment 11/19/24   PT Goals PT Goal 1   PT: Goal 1 Pt and spouse will verbalize understanding of d/c recs and feel comfortable with pt's mobility for return home with assist.  (goal met)   Interventions   Interventions Quick Adds Gait Training;Therapeutic Activity   Therapeutic Activity   Therapeutic Activities: dynamic activities to improve functional performance Minutes (48265) 5   Symptoms Noted During/After Treatment None   Treatment Detail/Skilled Intervention Cues to avoid excessive pushing with her hands to stand and to rely more on her legs with transfers-good return demo-close SBA/CGA. educated on technique with car transfers, reviewed c-spine precautions with pt and spouse per orders. discussed placement of objects at home to optimize compliance with current precautions. discussed recs for good lighting and rug removal at home   Gait Training   Gait Training Minutes (25833) 15   Symptoms Noted During/After Treatment (Gait Training) none   Treatment Detail/Skilled Intervention Pt reports did not like using the FWW earlier with the OT, pt reports has a 4WW at home, trialed during session-walking too fast, decreased balance-still required CGA. (CGA without AD). Spouse reports able to assist pt with all mobility at d/c. discussed may be better off with CGA for all mobility at d/c. pt and spouse in agreement. practiced up/down 1 step with hand hold assist x 4 reps, also up/down 4 steps with 1 railing with CGA, step to pattern, cues for technique and feeling step with foot due to c-collar limiting ability to look down at the step. Hands on training of spouse with amb/transfers and stairs during session with good return demo. Pt and spouse verbalized comfort for d/c based on pt's  current mobility.   Distance in Feet 175ft   PT Discharge Planning   PT Plan d/c PT-all goals met for d/c.   PT Discharge Recommendation (DC Rec) home with assist   PT Rationale for DC Rec Pt currently requires CGA for mobility. Spouse able to provide assist at d/c.   PT Brief overview of current status CGA   Physical Therapy Time and Intention   Timed Code Treatment Minutes 20   Total Session Time (sum of timed and untimed services) 30     [facility]  OUTPATIENT PHYSICAL THERAPY EVALUATION  PLAN OF TREATMENT FOR OUTPATIENT REHABILITATION  (COMPLETE FOR INITIAL CLAIMS ONLY)  Patient's Last Name, First Name, M.I.  YOB: 1955  PrettyMichaelle  RADHA                        Provider's Name  [facility] Medical Record No.  [id number]                             Onset Date:  11/18/24   Start of Care Date:  11/19/24   Type:     _X_PT   ___OT   ___SLP Medical Diagnosis:  CERVICAL 4- CERVICAL 5 ANTERIOR CERVICAL DISCECTOMY FUSION              PT Diagnosis:  impaired gait Visits from SOC:  1     See note for plan of treatment, functional goals and certification details    I CERTIFY THE NEED FOR THESE SERVICES FURNISHED UNDER        THIS PLAN OF TREATMENT AND WHILE UNDER MY CARE     (Physician co-signature of this document indicates review and certification of the therapy plan).

## 2024-11-19 NOTE — PROGRESS NOTES
[facility] MEDICINE PROGRESS NOTE      Identification/Summary: Michaelle Olsen is a 69 year old female with a past medical history of DM2, dyslipidemia, essential hypertension, coronary artery disease with coronary artery stent, CKD 3, COPD, obstructive sleep apnea, compliant with CPAP use, underwent C4-C5 and anterior cervical discectomy and fusion.  Back pain is stable.  No new radiculopathy.  Hemodynamically stable postoperatively.      Assessment and Plan:  Essential hypertension  Metoprolol 100 mg daily  Blood pressure is stable    DM2  Lantus 25 units twice a day  Diabetic diet and insulin sliding scale    Dyslipidemia  Crestor 40 mg daily    Coronary artery disease with coronary artery stent  Stable and asymptomatic  Resume aspirin, beta-blocker, statin    Obstructive sleep apnea  Compliant with CPAP use    COPD  Stable and asymptomatic  Albuterol inhaler/nebulizer 4 times a day as needed    CKD 3  Creatinine was 2-->1.9     Status post C4-C5 anterior cervical discectomy and fusion  Resume routine postoperative care  Physical and Occupational Therapy  Use incentive spirometry frequently  DVT prophylaxis per orthopedic surgery, early ambulation and SCDs  Pain control with Tylenol 975 mg every 8 hours, 650 mg every 4 hours as needed, oxycodone 5 to 10 mg every 4 hours as needed  Hemoglobin is stable at 12      Clinically Significant Risk Factors Present on Admission                 # Drug Induced Platelet Defect: home medication list includes an antiplatelet medication             # Overweight: Estimated body mass index is 26.37 kg/m  as calculated from the following:    Height as of this encounter: 1.524 m (5').    Weight as of this encounter: 61.2 kg (135 lb).              Diet: Advance Diet as Tolerated: Regular Diet Adult  Epps Catheter: Not present  Lines: None         Sravanthi Yeung MD  [facility] Medicine  [facility]  Phone:  #[phone]  Securely message with the Vocera Web Console (learn more here)  Text page via [place] [place] Paging/Directory     Interval History/Subjective:  Resting comfortably.  Neck pain is stable.  No new radiculopathy.  No other concern.  Will be discharging home today.    Physical Exam/Objective:  Temp:  [97  F (36.1  C)-98.5  F (36.9  C)] 97.9  F (36.6  C)  Pulse:  [69-78] 73  Resp:  [6-19] 16  BP: (107-161)/(58-86) 125/60  SpO2:  [93 %-100 %] 100 %  Body mass index is 26.37 kg/m .    GENERAL:  Alert, appears comfortable, in no acute distress, appears stated age   HEAD:  Normocephalic, without obvious abnormality, atraumatic   EYES:  PERRL, conjunctiva  clear,   EOM's intact   NOSE: Nares normal,  mucosa normal, no drainage   THROAT: Lips, mucosa,  gums normal, mouth moist   NECK: S/P neck surgery, collar in place   BACK:   Symmetric, no curvature, ROM normal   LUNGS:   Clear to auscultation bilaterally, no rales, rhonchi, or wheezing, symmetric chest rise on inhalation, respirations unlabored   CHEST WALL:  No tenderness or deformity   HEART:  Regular rate and rhythm, S1 and S2 normal, no murmur   ABDOMEN:   Soft, non-tender, bowel sounds active, no masses, no organomegaly, no rebound or guarding   EXTREMITIES: No edema    SKIN: No rashes in the visualized areas   NEURO: Alert, oriented x3, moves all four extremities freely   PSYCH: Cooperative, behavior is appropriate      Labs:  Most Recent 3 CBC's:  Recent Labs   Lab Test 11/19/24  0625   WBC 12.9*   HGB 12.1   MCV 89   *     Most Recent 3 BMP's:  Recent Labs   Lab Test 11/19/24  1153 11/19/24  0713 11/19/24  0625   NA  --   --  141   POTASSIUM  --   --  3.7   CHLORIDE  --   --  107   CO2  --   --  20*   BUN  --   --  18.1   CR  --   --  1.95*   ANIONGAP  --   --  14   SHEILA  --   --  8.7*   * 140* 147*     Most Recent 2 LFT's:No lab results found.    Medications:   Personally Reviewed.  Medications   Current Facility-Administered Medications    Medication Dose Route Frequency Provider Last Rate Last Admin     Current Facility-Administered Medications   Medication Dose Route Frequency Provider Last Rate Last Admin    acetaminophen (TYLENOL) tablet 975 mg  975 mg Oral Q8H Fernando Perez MD   975 mg at 11/19/24 0455    gabapentin (NEURONTIN) capsule 300 mg  300 mg Oral QAM Sravanthi Yeung MD   300 mg at 11/19/24 0749    gabapentin (NEURONTIN) capsule 600 mg  600 mg Oral At Bedtime Sravanthi Yeung MD        insulin aspart (NovoLOG) injection (RAPID ACTING)  1-7 Units Subcutaneous TID AC Sravanthi Yeung MD   1 Units at 11/19/24 0750    insulin aspart (NovoLOG) injection (RAPID ACTING)  1-5 Units Subcutaneous At Bedtime Sravanthi Yeung MD   1 Units at 11/18/24 2151    insulin glargine (LANTUS PEN) injection 25 Units  25 Units Subcutaneous BID Sravanthi Yeung MD   25 Units at 11/19/24 0749    metoprolol succinate ER (TOPROL XL) 24 hr tablet 100 mg  100 mg Oral Daily Sravanthi Yeung MD   100 mg at 11/19/24 0931    multivitamin, therapeutic (THERA-VIT) tablet 1 tablet  1 tablet Oral Daily Fernando Perez MD   1 tablet at 11/19/24 0749    polyethylene glycol (MIRALAX) Packet 17 g  17 g Oral Daily Fernando Perez MD        rosuvastatin (CRESTOR) tablet 40 mg  40 mg Oral Daily Sravanthi Yeung MD   40 mg at 11/19/24 0748    senna-docusate (SENOKOT-S/PERICOLACE) 8.6-50 MG per tablet 1 tablet  1 tablet Oral BID Fernando Perez MD        topiramate (TOPAMAX) tablet 50 mg  50 mg Oral BID Sravanthi Yeung MD   50 mg at 11/19/24 0749      Total time spent 40 min

## 2024-11-19 NOTE — PROGRESS NOTES
11/19/24 0833   Appointment Info   Signing Clinician's Name / Credentials (OT) Amy Rajput, OTR/L   Rehab Comments (OT) OT Sabino   Quick Adds   Quick Adds Certification   Living Environment   People in Home spouse   Current Living Arrangements house   Home Accessibility stairs to enter home;stairs within home   Number of Stairs, Main Entrance 5   Stair Railings, Main Entrance railings on both sides of stairs   Living Environment Comments Able to stay on one level, tub/shower, standard toilet w/ vanity   Self-Care   Usual Activity Tolerance moderate   Current Activity Tolerance fair   Fall history within last six months yes   Number of times patient has fallen within last six months 1  (reports related to BP dropping)   Instrumental Activities of Daily Living (IADL)   IADL Comments Pt reports typically I wih all ADLs as baseline, does not work, spouse often assists with tub/shower for safety   General Information   Onset of Illness/Injury or Date of Surgery 11/18/24   Referring Physician Fernando Perez MD   Patient/Family Therapy Goal Statement (OT) To go home today   Additional Occupational Profile Info/Pertinent History of Current Problem s/p C4-C5 discetomy/fusion   Existing Precautions/Restrictions lifting;no pivoting or twisting;spinal  ([place] hard collar brace at all times)   Limitations/Impairments safety/cognitive   General Observations and Info Pt wears bilateral hearing aids, though took them out during the session   Cognitive Status Examination   Orientation Status orientation to person, place and time   Follows Commands follows one-step commands;increased processing time needed;physical/tactile prompts required   Safety Deficit judgment;insight into deficits/self-awareness;impulsivity   Memory Deficit   (cues amd reminders for precuations)   Cognitive Status Comments Spouse was aware of differences in mentation and expressed similarly that her thinking has been this way with past UTI   Sensory    Sensory Comments reports numbness in B ue at baseline   Pain Assessment   Patient Currently in Pain No   Range of Motion Comprehensive   General Range of Motion bilateral upper extremity ROM WFL   Strength Comprehensive (MMT)   Comment, General Manual Muscle Testing (MMT) Assessment generalized weakness   Bed Mobility   Comment (Bed Mobility) SBA-CGA   Transfers   Transfers bed-chair transfer;toilet transfer;shower transfer   Transfer Skill: Bed to Chair/Chair to Bed   Transfer Comments CGA   Shower Transfer   Shower Transfer Comments CGA   Toilet Transfer   Toilet Transfer Comments CGA   Balance   Balance Assessment standing dynamic balance   Balance Comments CGA   Activities of Daily Living   BADL Assessment/Intervention lower body dressing;toileting   Lower Body Dressing Assessment/Training   Comment, (Lower Body Dressing) CGA   Toileting   Comment, (Toileting) CGA   Clinical Impression   Criteria for Skilled Therapeutic Interventions Met (OT) Yes, treatment indicated   OT Diagnosis Decreased ADL independence   Influenced by the following impairments Cervical surgery   OT Problem List-Impairments impacting ADL cognition;mobility;post-surgical precautions;sensation   Assessment of Occupational Performance 3-5 Performance Deficits   Identified Performance Deficits dressing, grooming, fx mob, bed mob   Planned Therapy Interventions (OT) ADL retraining;bed mobility training;transfer training   Clinical Decision Making Complexity (OT) detailed assessment/moderate complexity   Risk & Benefits of therapy have been explained evaluation/treatment results reviewed;care plan/treatment goals reviewed;risks/benefits reviewed   OT Total Evaluation Time   OT Eval, Moderate Complexity Minutes (72914) 10   Therapy Certification   Medical Diagnosis Cervical Fusion   Start of Care Date 11/19/24   Certification date from 11/19/24   Certification date to 12/19/24   OT Goals   Therapy Frequency (OT) One time eval and treatment   OT  Predicted Duration/Target Date for Goal Attainment 11/19/24   OT Goals Lower Body Dressing;Bed Mobility;Toilet Transfer/Toileting   OT: Lower Body Dressing Supervision/stand-by assist;within precautions;Goal Met   OT: Bed Mobility Supervision/stand-by assist;Goal Met;within precautions   OT: Toilet Transfer/Toileting Supervision/stand-by assist;Goal Met;within precautions   Interventions   Interventions Quick Adds Self-Care/Home Management   Self-Care/Home Management   Self-Care/Home Mgmt/ADL, Compensatory, Meal Prep Minutes (11178) 30   Symptoms Noted During/After Treatment (Meal Preparation/Planning Training) none   Treatment Detail/Skilled Intervention Pt in bed upon arrival with  present in room and throughout session. Increase time for education pt to understand roll of OT intervention. Pt needing increased verbal cues throughout session for safety and problem solving. Ed on cervical precuations and provided handout to spouse and pt with verbal understanding. Ed bed mob and completed w/SBA x2 reps for ensured learning. SBA w/vc for toilet transfers.Ambulated in room without device, noted to touch surfaces for balance and taking small steps, provided pt w/FWW and cued for safet tech with in room and hallway. Demo tub transfer completed w/CGA and verbal and tactile cues. Returned to chair in room w/CGA . Vc for safe tech w/dressing and to sit down vs stand, mod vc to notice orient pants and adjust when placed in wrong leg.  Ed on car transfer, spouse verbalized understanding.  Ed pt on a spouse on need for safety upon discharge and plan for spouse to provide 24 hour care. Left pt in room w/spouse.   OT Discharge Planning   OT Plan DC OT   OT Discharge Recommendation (DC Rec) home with assist   OT Rationale for DC Rec Pt's spouse is able to provide 24 hour assistance for safety upon discharge and has good understanding of cervical precuations as pt demo cognitive challenges with memory and judgement at this  time.   OT Brief overview of current status SBA and vc for safety with all ADLs   Total Session Time   Timed Code Treatment Minutes 30   Total Session Time (sum of timed and untimed services) 40    [facility]  OUTPATIENT OCCUPATIONAL THERAPY  EVALUATION  PLAN OF TREATMENT FOR OUTPATIENT REHABILITATION  (COMPLETE FOR INITIAL CLAIMS ONLY)  Patient's Last Name, First Name, M.I.  YOB: 1955  Michaelle Olsen                          Provider's Name  [facility] Medical Record No.  [id number]                             Onset Date:  11/18/24   Start of Care Date:  (P) 11/19/24   Type:     ___PT   _X_OT   ___SLP Medical Diagnosis:  (P) Cervical Fusion                    OT Diagnosis:  Decreased ADL independence Visits from SOC:  1     See note for plan of treatment, functional goals and certification details    I CERTIFY THE NEED FOR THESE SERVICES FURNISHED UNDER        THIS PLAN OF TREATMENT AND WHILE UNDER MY CARE     (Physician co-signature of this document indicates review and certification of the therapy plan).                            Occupational Therapy Discharge Summary    Reason for therapy discharge:    All goals and outcomes met, no further needs identified.    Progress towards therapy goal(s). See goals on Care Plan in [facility] electronic health record for goal details.  Goals met    Therapy recommendation(s):    No further therapy is recommended.Spouse to provide 24 hour supervision and assistance with all ADLs.

## 2024-11-19 NOTE — DISCHARGE SUMMARY
[facility] Discharge Summary                                  [facility]     AGUILAR ANGELO [id number]   Age: 69 year old  PCP: Deonte Sarkar, [phone] 1955     Date of Admission:  11/18/2024  Date of Discharge::  11/19/2024  Discharge Provider:  JUANA Urbina CNP    Code status:  Full Code    Admission Information:  Admission Diagnosis:  Neck pain [M54.2]  Spinal stenosis in cervical region [M48.02]  Brachial neuritis [M54.12]    Post-Operative Day: 1 Day Post-Op     Reason for admission:  The patient was admitted for the following:Procedure(s) (LRB):  CERVICAL 4- CERVICAL 5 ANTERIOR CERVICAL DISCECTOMY FUSION (N/A)    Active Problems:    Cervical myelopathy (H)      Allergies:  Patient has no known allergies.    Following the procedure noted above the patient was transferred to the post-op floor and started on:    Therapy:  physical therapy and occupational therapy  Anticoagulation Plan: Mechanical and/or ambulation   May resume PTA aspirin after 5 days postop  Pain Management: scopainmedication: oxycodone, tylenol, and Flexeril   Weight bearing status: Weight bearing as tolerated, strict no heavy lifting, twisting, or bending requirements for the next 3 months      The patient was followed by Orthopedics during the inpatient treatment course:  Complications:  None  Additional consultations:  Hospitalist      Pertinent Labs   Lab Results: personally reviewed.     Recent Labs   Lab Test 11/19/24  0625   WBC 12.9*   HGB 12.1   HCT 37.6   MCV 89   *             Discharge Information:  Condition at discharge: Stable  Discharge destination:  Discharged to home     Medications at discharge:  Current Discharge Medication List        START taking these medications    Details   aspirin 81 MG EC tablet Take by mouth. May resume 11/24/24      oxyCODONE (ROXICODONE) 5 MG tablet Take 1-2 tablets (5-10 mg) by mouth every 4 hours as needed for moderate pain.  Qty: 30  tablet, Refills: 0    Associated Diagnoses: Cervical myelopathy (H)      senna-docusate (SENOKOT-S/PERICOLACE) 8.6-50 MG tablet Take 1 tablet by mouth 2 times daily as needed for constipation.  Qty: 42 tablet, Refills: 0    Associated Diagnoses: Cervical myelopathy (H)           CONTINUE these medications which have CHANGED    Details   cyclobenzaprine (FLEXERIL) 10 MG tablet Take 1 tablet (10 mg) by mouth 3 times daily as needed for muscle spasms.           CONTINUE these medications which have NOT CHANGED    Details   acetaminophen (TYLENOL) 500 MG tablet Take 2 tablets by mouth every 6 hours as needed.      albuterol (PROAIR HFA/PROVENTIL HFA/VENTOLIN HFA) 108 (90 Base) MCG/ACT inhaler Inhale 2 puffs into the lungs every 6 hours as needed for shortness of breath, wheezing or cough.      albuterol (PROVENTIL) (2.5 MG/3ML) 0.083% neb solution Inhale 2.5 mg into the lungs 3 times daily as needed.      cyanocobalamin (CYANOCOBALAMIN) 1000 mcg/mL injection Inject 1,000 mcg into the muscle once a week.      !! gabapentin (NEURONTIN) 300 MG capsule Take 600 mg by mouth at bedtime.      !! gabapentin (NEURONTIN) 300 MG capsule Take 300 mg by mouth every morning.      insulin aspart (NOVOLOG FLEXPEN) 100 UNIT/ML pen Inject subcutaneously 3 times daily (with meals). 1 Unit per 4 Grams of carb at meals      insulin glargine (LANTUS) 100 UNIT/ML PEN Inject 25 Units subcutaneously 2 times daily.      METOPROLOL SUCCINATE ER PO Take 100 mg by mouth daily.      Rosuvastatin Calcium (CRESTOR PO) Take 40 mg by mouth daily.      topiramate (TOPAMAX) 50 MG tablet Take 50 mg by mouth 2 times daily.      Vitamin D3 50 mcg (2000 units) tablet Take 2,000 Units by mouth daily.       !! - Potential duplicate medications found. Please discuss with provider.        STOP taking these medications       aspirin 81 MG tablet Comments:   Reason for Stopping:                          Follow-Up Care:  Patient should be seen in the office in 14  days by the Orthopedic Surgeon/Physician Assistant.  Call [phone] for appointment or questions.    It was my pleasure to take care of the above patient.  JUANA Urbina CNP

## 2024-11-19 NOTE — PROGRESS NOTES
[facility]opaedics Progress Note      Post-operative Day: 1 Day Post-Op    Procedure(s):  CERVICAL 4- CERVICAL 5 ANTERIOR CERVICAL DISCECTOMY FUSION      Subjective: Patient seen up resting in bedside chair, reports throbbing pain this AM, did require IV narcotics. Reports now much improved. No new numbness, tingling or weakness to extremities. Able to swallow without difficulty. Voiding and passing gas. Eager to discharge home this afternoon.     Pain: moderate  Chest pain, SOB:  No      Objective:  Blood pressure 125/60, pulse 73, temperature 97.9  F (36.6  C), temperature source Oral, resp. rate 16, height 1.524 m (5'), weight 61.2 kg (135 lb), SpO2 100%.    Patient Vitals for the past 24 hrs:   BP Temp Temp src Pulse Resp SpO2   11/19/24 0930 125/60 -- -- 73 -- --   11/19/24 0742 (!) 142/68 97.9  F (36.6  C) Oral 76 16 100 %   11/19/24 0458 -- -- -- -- 16 99 %   11/19/24 0251 126/58 97.8  F (36.6  C) Oral 77 16 96 %   11/19/24 0053 (!) 145/70 97.7  F (36.5  C) Oral 77 16 97 %   11/18/24 2240 131/65 97.5  F (36.4  C) Oral 78 18 96 %   11/18/24 1840 (!) 161/74 97.5  F (36.4  C) Oral 70 18 96 %   11/18/24 1740 138/63 97.4  F (36.3  C) Oral 73 18 95 %   11/18/24 1640 133/70 98.5  F (36.9  C) Oral 69 18 93 %   11/18/24 1610 139/67 97.3  F (36.3  C) Oral 69 19 94 %   11/18/24 1540 (!) 142/79 97.5  F (36.4  C) Oral 73 10 94 %   11/18/24 1520 (!) 145/86 97.2  F (36.2  C) -- 70 10 97 %   11/18/24 1510 (!) 147/82 97.2  F (36.2  C) -- 70 13 97 %   11/18/24 1500 (!) 146/72 97.2  F (36.2  C) -- 71 (!) 9 97 %   11/18/24 1450 131/75 97.2  F (36.2  C) -- 71 (!) 9 97 %   11/18/24 1440 (!) 141/81 97.2  F (36.2  C) -- 71 (!) 8 95 %   11/18/24 1430 (!) 142/77 97.2  F (36.2  C) -- 72 11 97 %   11/18/24 1420 129/72 97.2  F (36.2  C) -- 73 (!) 8 98 %   11/18/24 1410 128/65 97  F (36.1  C) -- 72 (!) 8 98 %   11/18/24 1400 122/59 97.2  F (36.2  C) -- 71 (!) 6 98 %   11/18/24 1358 122/59 97.2  F (36.2  C) -- 71 (!) 6 98 %    11/18/24 1350 107/58 97.5  F (36.4  C) -- 71 (!) 9 99 %       Wt Readings from Last 4 Encounters:   11/18/24 61.2 kg (135 lb)   11/15/16 61.2 kg (135 lb)       General: Alert and orientated, NAD  Respiratory: Non-labored breathing on RA  Cervical collar in place  BUE/BLE motor function, sensation, and circulation intact   Yes, Dorsiflexion/plantarflexion intact and equal bilaterally. Moves all other extremities with ease and purpose   Wound status: incisions are clean dry and intact. Yes  Calf tenderness: Bilateral  No    Pertinent Labs   Lab Results: personally reviewed.     Recent Labs   Lab Test 11/19/24  0625   WBC 12.9*   HGB 12.1   HCT 37.6   MCV 89   *          Plan:   Anticoagulation protocol: Mechanical and/or ambulation   May resume PTA aspirin after 5 days postop  Pain medications:  scopainmedication: oxycodone, tylenol, and Flexeril   Weight bearing status:    Disposition:  Home this afternoon if pain controlled on PO analgesics, cleared by therapies and hospitalist    Continue cares and rehabilitation     Report completed by:  JUANA Urbina CNP  Date: 11/19/2024  Time: 11:05 AM

## 2024-11-19 NOTE — PLAN OF CARE
Patient vital signs are at baseline: Yes  Patient able to ambulate as they were prior to admission or with assist devices provided by therapies during their stay:  Yes  Patient MUST void prior to discharge:  No,  Reason:  Voided 50 ml with PVR of 410. Straight catheterized at 1736 for 400 ml.    Patient able to tolerate oral intake:  Yes - passed nursing dysphagia screen but remains on clear liquid diet - no flatus.   Pain has adequate pain control using Oral analgesics:  Yes  Does patient have an identified :  Yes  Has goal D/C date and time been discussed with patient:  Yes - home pending progression

## 2024-11-19 NOTE — PLAN OF CARE
Problem: Adult Inpatient Plan of Care  Goal: Absence of Hospital-Acquired Illness or Injury  Intervention: Identify and Manage Fall Risk  Problem: Spinal Surgery  Goal: Optimal Pain Control and Function  Intervention: Prevent or Manage Pain   Problem: Spinal Surgery  Goal: Absence of Bleeding  Intervention: Monitor and Manage Bleeding   Problem: Adult Inpatient Plan of Care  Goal: Absence of Hospital-Acquired Illness or Injury  Intervention: Prevent and Manage VTE (Venous Thromboembolism) Risk    Goal Outcome Evaluation:  Patient vital signs are at baseline: Yes  Patient able to ambulate as they were prior to admission or with assist devices provided by therapies during their stay:  Yes  Patient MUST void prior to discharge:  Yes. Voided with urinary retention. Bladder scanned for 368 ml and was straight cathed for 340 ml.   Patient able to tolerate oral intake:  Yes  Pain has adequate pain control using Oral analgesics:  Yes. Scheduled Tylenol and PRN Oxycodone administered for pain control.    Does patient have an identified :  Yes  Has goal D/C date and time been discussed with patient:  Yes

## 2024-11-19 NOTE — PROGRESS NOTES
Patient transferred [facility] belongings and  present.

## 2024-11-19 NOTE — PLAN OF CARE
Patient vital signs are at baseline: Yes  Patient able to ambulate as they were prior to admission or with assist devices provided by therapies during their stay:  Yes  Patient MUST void prior to discharge:  Yes Voiding small amounts, PVR > 400 ml was straight cathd for 425 ml.  Patient able to tolerate oral intake:  Yes  Pain has adequate pain control using Oral analgesics:  No,  Reason:  Gave IV dilaudid x 1 for breakthrough pain.  Does patient have an identified :  Yes  Has goal D/C date and time been discussed with patient:  Yes

## 2024-11-19 NOTE — PROGRESS NOTES
Pt did not want to use her cpap tonight. Currently on RA with SAT in mid 90's. RT will continue to follow pt.

## 2025-01-21 ENCOUNTER — TELEPHONE (OUTPATIENT)
Dept: CONSULT | Facility: CLINIC | Age: 70
End: 2025-01-21
Payer: COMMERCIAL

## 2025-01-21 NOTE — TELEPHONE ENCOUNTER
Michaelle got my number from her relatives who I have met with previously. 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 We arranged for a telephone genetic counseling visit on 1/31 at 8am.     Jahaira Kam MS, [facility]  Licensed Genetic Counselor  [facility]  Phone: [phone]  Fax: [phone]

## 2025-02-18 ENCOUNTER — TELEPHONE (OUTPATIENT)
Dept: CONSULT | Facility: CLINIC | Age: 70
End: 2025-02-18
Payer: COMMERCIAL

## 2025-02-18 NOTE — TELEPHONE ENCOUNTER
I called Lala and left a voicemail asking her to call me back to review her genetic test results.     Jahaira Kam MS, [facility]  Licensed Genetic Counselor  [facility]  Phone: [phone]  Fax: [phone]

## 2025-02-19 NOTE — TELEPHONE ENCOUNTER
Lala returned my call and we reviewed the results of her genetic testing for the familial DMD variant. The results of this test for Lala are NEGATIVE/NORMAL, meaning she does not have the familial DMD variant.         I have no other recommendations for Lala based on these negative results. I will mail her a copy of her test report to keep for her records.     Jahaira Kam MS, [facility]  Licensed Genetic Counselor  [facility]  Phone: [phone]  Fax: [phone]

## 2025-08-22 ENCOUNTER — HOSPITAL ENCOUNTER (OUTPATIENT)
Dept: NUCLEAR MEDICINE | Facility: CLINIC | Age: 70
Discharge: HOME | End: 2025-08-22
Attending: FAMILY MEDICINE
Payer: COMMERCIAL

## 2025-08-22 ENCOUNTER — HOSPITAL ENCOUNTER (OUTPATIENT)
Dept: CARDIOLOGY | Facility: CLINIC | Age: 70
Discharge: HOME | End: 2025-08-22
Attending: FAMILY MEDICINE
Payer: COMMERCIAL

## 2025-08-22 DIAGNOSIS — I25.10 CAD (CORONARY ARTERY DISEASE): ICD-10-CM

## 2025-08-22 DIAGNOSIS — R07.89 ATYPICAL CHEST PAIN: ICD-10-CM

## 2025-08-22 LAB
CV STRESS CURRENT BP HE: NORMAL
CV STRESS CURRENT HR HE: 60
CV STRESS CURRENT HR HE: 62
CV STRESS CURRENT HR HE: 63
CV STRESS CURRENT HR HE: 69
CV STRESS CURRENT HR HE: 71
CV STRESS CURRENT HR HE: 71
CV STRESS CURRENT HR HE: 72
CV STRESS CURRENT HR HE: 73
CV STRESS CURRENT HR HE: 76
CV STRESS DEVIATION TIME HE: NORMAL
CV STRESS ECHO PERCENT HR HE: NORMAL
CV STRESS EXERCISE STAGE HE: NORMAL
CV STRESS FINAL RESTING BP HE: NORMAL
CV STRESS FINAL RESTING HR HE: 72
CV STRESS MAX HR HE: 76
CV STRESS MAX TREADMILL GRADE HE: 0
CV STRESS MAX TREADMILL SPEED HE: 0
CV STRESS PEAK DIA BP HE: NORMAL
CV STRESS PEAK SYS BP HE: NORMAL
CV STRESS PHASE HE: NORMAL
CV STRESS PROTOCOL HE: NORMAL
CV STRESS RESTING PT POSITION HE: NORMAL
CV STRESS ST DEVIATION AMOUNT HE: NORMAL
CV STRESS ST DEVIATION ELEVATION HE: NORMAL
CV STRESS ST EVELATION AMOUNT HE: NORMAL
CV STRESS TEST TYPE HE: NORMAL
CV STRESS TOTAL STAGE TIME MIN 1 HE: NORMAL
NUC STRESS EJECTION FRACTION: 73 %
RATE PRESSURE PRODUCT: 7752
STRESS ECHO BASELINE DIASTOLIC HE: 61
STRESS ECHO BASELINE HR: 63
STRESS ECHO BASELINE SYSTOLIC BP: 119
STRESS ECHO CALCULATED PERCENT HR: 51 %
STRESS ECHO LAST STRESS DIASTOLIC BP: 54
STRESS ECHO LAST STRESS HR: 76
STRESS ECHO LAST STRESS SYSTOLIC BP: 102
STRESS ECHO TARGET HR: 150

## 2025-08-22 PROCEDURE — 78452 HT MUSCLE IMAGE SPECT MULT: CPT

## 2025-08-22 PROCEDURE — 93018 CV STRESS TEST I&R ONLY: CPT | Performed by: INTERNAL MEDICINE

## 2025-08-22 PROCEDURE — 78452 HT MUSCLE IMAGE SPECT MULT: CPT | Mod: 26 | Performed by: INTERNAL MEDICINE

## 2025-08-22 PROCEDURE — 93017 CV STRESS TEST TRACING ONLY: CPT

## 2025-08-22 PROCEDURE — 343N000001 HC RX 343 MED OP 636

## 2025-08-22 PROCEDURE — 250N000011 HC RX IP 250 OP 636

## 2025-08-22 PROCEDURE — A9500 TC99M SESTAMIBI: HCPCS

## 2025-08-22 PROCEDURE — 93016 CV STRESS TEST SUPVJ ONLY: CPT | Performed by: INTERNAL MEDICINE

## 2025-08-22 RX ORDER — CAFFEINE CITRATE 20 MG/ML
60 SOLUTION INTRAVENOUS
Status: DISCONTINUED | OUTPATIENT
Start: 2025-08-22 | End: 2025-08-22 | Stop reason: HOSPADM

## 2025-08-22 RX ORDER — AMINOPHYLLINE 25 MG/ML
50-100 INJECTION, SOLUTION INTRAVENOUS
Status: DISCONTINUED | OUTPATIENT
Start: 2025-08-22 | End: 2025-08-23 | Stop reason: HOSPADM

## 2025-08-22 RX ORDER — CAFFEINE 200 MG
200 TABLET ORAL
Status: DISCONTINUED | OUTPATIENT
Start: 2025-08-22 | End: 2025-08-22 | Stop reason: HOSPADM

## 2025-08-22 RX ORDER — REGADENOSON 0.08 MG/ML
0.4 INJECTION, SOLUTION INTRAVENOUS ONCE
Status: COMPLETED | OUTPATIENT
Start: 2025-08-22 | End: 2025-08-22

## 2025-08-22 RX ORDER — ALBUTEROL SULFATE 0.83 MG/ML
2.5 SOLUTION RESPIRATORY (INHALATION)
Status: DISCONTINUED | OUTPATIENT
Start: 2025-08-22 | End: 2025-08-22 | Stop reason: HOSPADM

## 2025-08-22 RX ADMIN — REGADENOSON 0.4 MG: 0.08 INJECTION, SOLUTION INTRAVENOUS at 09:54

## 2025-08-22 RX ADMIN — TECHNETIUM TC 99M SESTAMIBI 8.2 MILLICURIE: 1 INJECTION INTRAVENOUS at 08:55

## 2025-08-22 RX ADMIN — TECHNETIUM TC 99M SESTAMIBI 30.1 MILLICURIE: 1 INJECTION INTRAVENOUS at 09:55

## (undated) DEVICE — Device

## (undated) DEVICE — GLOVE UNDER INDICATOR PI SZ 7.0 LF 41670

## (undated) DEVICE — DRAPE C-ARM 60X42" 1013

## (undated) DEVICE — ESU ELEC BLADE 2.75" COATED/INSULATED E1455

## (undated) DEVICE — RX SURGIFLO HEMOSTATIC MATRIX 8ML 2991

## (undated) DEVICE — IOM CASE FLAT FEE

## (undated) DEVICE — SOL WATER IRRIG 1000ML BOTTLE 2F7114

## (undated) DEVICE — PACK MINOR SINGLE BASIN SSK3001

## (undated) DEVICE — TOOL DISSECT MIDAS MR8 14CM MTL CUTTR 3MM MR8-14MC30

## (undated) DEVICE — CATH IV 14GA 2IN REM FLASHPLUG DEHP-FR PVC FR 4251717-02

## (undated) DEVICE — IOM STANDARD SUPPLY FEE

## (undated) DEVICE — PREP CHLORAPREP W/ORANGE TINT 10.5ML 930715

## (undated) DEVICE — ELECTRODE PATIENT RETURN ADULT L10 FT 2 PLATE CORD 0855C

## (undated) DEVICE — SUCTION MANIFOLD NEPTUNE 2 SYS 1 PORT 702-025-000

## (undated) DEVICE — PROTECTOR ARM STANDARD ONE STEP 40433 (COI)

## (undated) DEVICE — DRAPE THYROID

## (undated) DEVICE — SU MONOCRYL+ 4-0 18IN PS2 UND MCP496G

## (undated) DEVICE — SU PDS II 3-0 SH 27" Z316H

## (undated) DEVICE — DRAPE STERI TOWEL LG 1010

## (undated) DEVICE — DRSG STERI STRIP 1/2X4" R1547

## (undated) DEVICE — PIN DISTRACTION 12MM TI BLUE DP-12-TB

## (undated) DEVICE — SOL NACL 0.9% IRRIG 1000ML BOTTLE 2F7124

## (undated) DEVICE — GLOVE BIOGEL PI SZ 7.5 40875

## (undated) DEVICE — GOWN LG DISP 9515

## (undated) DEVICE — SPONGE KITNER DISSECTING 7102*

## (undated) DEVICE — ESU PENCIL SMOKE EVAC W/ROCKER SWITCH 0703-047-000

## (undated) DEVICE — CUSTOM PACK LUMBAR FUSION SNE5BLFHEA

## (undated) DEVICE — TOOL DISSECT MIDAS MR8 14CM MATCH HEAD 3MM MR8-14MH30

## (undated) DEVICE — DRSG PRIMAPORE 03 1/8X6" 66000318

## (undated) DEVICE — GLOVE BIOGEL PI ULTRATOUCH G SZ 6.5 42165

## (undated) RX ORDER — PROPOFOL 10 MG/ML
INJECTION, EMULSION INTRAVENOUS
Status: DISPENSED
Start: 2024-11-18

## (undated) RX ORDER — LIDOCAINE HYDROCHLORIDE 10 MG/ML
INJECTION, SOLUTION EPIDURAL; INFILTRATION; INTRACAUDAL; PERINEURAL
Status: DISPENSED
Start: 2024-11-18

## (undated) RX ORDER — DEXAMETHASONE SODIUM PHOSPHATE 10 MG/ML
INJECTION, EMULSION INTRAMUSCULAR; INTRAVENOUS
Status: DISPENSED
Start: 2024-11-18

## (undated) RX ORDER — ONDANSETRON 2 MG/ML
INJECTION INTRAMUSCULAR; INTRAVENOUS
Status: DISPENSED
Start: 2024-11-18

## (undated) RX ORDER — FENTANYL CITRATE 50 UG/ML
INJECTION, SOLUTION INTRAMUSCULAR; INTRAVENOUS
Status: DISPENSED
Start: 2024-11-18